# Patient Record
Sex: FEMALE | Race: WHITE
[De-identification: names, ages, dates, MRNs, and addresses within clinical notes are randomized per-mention and may not be internally consistent; named-entity substitution may affect disease eponyms.]

---

## 2019-12-05 ENCOUNTER — HOSPITAL ENCOUNTER (OUTPATIENT)
Dept: HOSPITAL 100 - SDC | Age: 49
Discharge: HOME | End: 2019-12-05
Payer: COMMERCIAL

## 2019-12-05 VITALS
RESPIRATION RATE: 16 BRPM | SYSTOLIC BLOOD PRESSURE: 145 MMHG | OXYGEN SATURATION: 94 % | HEART RATE: 92 BPM | DIASTOLIC BLOOD PRESSURE: 78 MMHG

## 2019-12-05 VITALS
RESPIRATION RATE: 16 BRPM | SYSTOLIC BLOOD PRESSURE: 153 MMHG | HEART RATE: 91 BPM | OXYGEN SATURATION: 94 % | DIASTOLIC BLOOD PRESSURE: 83 MMHG

## 2019-12-05 VITALS
OXYGEN SATURATION: 94 % | DIASTOLIC BLOOD PRESSURE: 83 MMHG | HEART RATE: 87 BPM | RESPIRATION RATE: 16 BRPM | SYSTOLIC BLOOD PRESSURE: 141 MMHG | TEMPERATURE: 97.34 F

## 2019-12-05 VITALS
HEART RATE: 90 BPM | DIASTOLIC BLOOD PRESSURE: 83 MMHG | SYSTOLIC BLOOD PRESSURE: 149 MMHG | OXYGEN SATURATION: 97 % | RESPIRATION RATE: 16 BRPM

## 2019-12-05 VITALS
OXYGEN SATURATION: 97 % | DIASTOLIC BLOOD PRESSURE: 90 MMHG | SYSTOLIC BLOOD PRESSURE: 148 MMHG | RESPIRATION RATE: 16 BRPM | HEART RATE: 91 BPM

## 2019-12-05 VITALS
HEART RATE: 78 BPM | DIASTOLIC BLOOD PRESSURE: 83 MMHG | TEMPERATURE: 98.4 F | RESPIRATION RATE: 16 BRPM | OXYGEN SATURATION: 97 % | SYSTOLIC BLOOD PRESSURE: 145 MMHG

## 2019-12-05 VITALS
HEART RATE: 93 BPM | RESPIRATION RATE: 16 BRPM | OXYGEN SATURATION: 100 % | SYSTOLIC BLOOD PRESSURE: 145 MMHG | TEMPERATURE: 97.52 F | DIASTOLIC BLOOD PRESSURE: 83 MMHG

## 2019-12-05 VITALS — SYSTOLIC BLOOD PRESSURE: 145 MMHG | DIASTOLIC BLOOD PRESSURE: 83 MMHG

## 2019-12-05 VITALS — BODY MASS INDEX: 39.3 KG/M2

## 2019-12-05 DIAGNOSIS — I10: ICD-10-CM

## 2019-12-05 DIAGNOSIS — E03.9: ICD-10-CM

## 2019-12-05 DIAGNOSIS — Z79.899: ICD-10-CM

## 2019-12-05 DIAGNOSIS — N88.2: ICD-10-CM

## 2019-12-05 DIAGNOSIS — N93.9: Primary | ICD-10-CM

## 2019-12-05 DIAGNOSIS — R93.89: ICD-10-CM

## 2019-12-05 LAB — INTERNAL QC VALIDATED?: (no result)

## 2019-12-05 PROCEDURE — 00952 ANES VAG PX HYSTSC&/HSG: CPT

## 2019-12-05 PROCEDURE — 88305 TISSUE EXAM BY PATHOLOGIST: CPT

## 2019-12-05 PROCEDURE — 58558 HYSTEROSCOPY BIOPSY: CPT

## 2019-12-05 PROCEDURE — 81025 URINE PREGNANCY TEST: CPT

## 2019-12-05 PROCEDURE — 0UB98ZZ EXCISION OF UTERUS, VIA NATURAL OR ARTIFICIAL OPENING ENDOSCOPIC: ICD-10-PCS | Performed by: OBSTETRICS & GYNECOLOGY

## 2019-12-05 RX ADMIN — SODIUM CHLORIDE 10 ML: 9 INJECTION, SOLUTION INTRAMUSCULAR; INTRAVENOUS; SUBCUTANEOUS at 16:46

## 2019-12-05 RX ADMIN — KETOROLAC TROMETHAMINE 30 MG: 30 INJECTION, SOLUTION INTRAMUSCULAR; INTRAVENOUS at 13:55

## 2023-04-10 ENCOUNTER — OFFICE VISIT (OUTPATIENT)
Dept: PRIMARY CARE | Facility: CLINIC | Age: 53
End: 2023-04-10
Payer: COMMERCIAL

## 2023-04-10 VITALS
SYSTOLIC BLOOD PRESSURE: 138 MMHG | TEMPERATURE: 97.7 F | WEIGHT: 200.4 LBS | HEIGHT: 59 IN | BODY MASS INDEX: 40.4 KG/M2 | DIASTOLIC BLOOD PRESSURE: 86 MMHG | HEART RATE: 83 BPM

## 2023-04-10 DIAGNOSIS — M79.645 THUMB PAIN, LEFT: ICD-10-CM

## 2023-04-10 DIAGNOSIS — R53.83 OTHER FATIGUE: ICD-10-CM

## 2023-04-10 DIAGNOSIS — E55.9 VITAMIN D DEFICIENCY: ICD-10-CM

## 2023-04-10 DIAGNOSIS — Z00.00 HEALTHCARE MAINTENANCE: ICD-10-CM

## 2023-04-10 DIAGNOSIS — N95.1 MENOPAUSAL SYMPTOMS: ICD-10-CM

## 2023-04-10 DIAGNOSIS — R73.03 PREDIABETES: Primary | ICD-10-CM

## 2023-04-10 DIAGNOSIS — E66.01 CLASS 3 SEVERE OBESITY DUE TO EXCESS CALORIES WITH SERIOUS COMORBIDITY AND BODY MASS INDEX (BMI) OF 40.0 TO 44.9 IN ADULT (MULTI): ICD-10-CM

## 2023-04-10 DIAGNOSIS — I10 PRIMARY HYPERTENSION: ICD-10-CM

## 2023-04-10 DIAGNOSIS — E03.9 HYPOTHYROIDISM, UNSPECIFIED TYPE: ICD-10-CM

## 2023-04-10 PROBLEM — E66.813 CLASS 3 SEVERE OBESITY DUE TO EXCESS CALORIES WITH SERIOUS COMORBIDITY AND BODY MASS INDEX (BMI) OF 40.0 TO 44.9 IN ADULT: Status: ACTIVE | Noted: 2023-04-10

## 2023-04-10 PROCEDURE — 99205 OFFICE O/P NEW HI 60 MIN: CPT | Performed by: INTERNAL MEDICINE

## 2023-04-10 PROCEDURE — 3075F SYST BP GE 130 - 139MM HG: CPT | Performed by: INTERNAL MEDICINE

## 2023-04-10 PROCEDURE — 3008F BODY MASS INDEX DOCD: CPT | Performed by: INTERNAL MEDICINE

## 2023-04-10 PROCEDURE — 3079F DIAST BP 80-89 MM HG: CPT | Performed by: INTERNAL MEDICINE

## 2023-04-10 PROCEDURE — 1036F TOBACCO NON-USER: CPT | Performed by: INTERNAL MEDICINE

## 2023-04-10 RX ORDER — METFORMIN HYDROCHLORIDE EXTENDED-RELEASE TABLETS 500 MG/1
500 TABLET, FILM COATED, EXTENDED RELEASE ORAL 2 TIMES DAILY
COMMUNITY
End: 2023-05-15 | Stop reason: ALTCHOICE

## 2023-04-10 RX ORDER — LEVOTHYROXINE SODIUM 50 UG/1
50 TABLET ORAL DAILY
COMMUNITY
End: 2023-05-15 | Stop reason: SDUPTHER

## 2023-04-10 RX ORDER — HYDROCHLOROTHIAZIDE 25 MG/1
25 TABLET ORAL DAILY
COMMUNITY
End: 2023-05-15 | Stop reason: SDUPTHER

## 2023-04-10 RX ORDER — OSPEMIFENE 60 MG/1
1 TABLET, FILM COATED ORAL DAILY
COMMUNITY

## 2023-04-10 RX ORDER — ALBUTEROL SULFATE 90 UG/1
2 AEROSOL, METERED RESPIRATORY (INHALATION) EVERY 4 HOURS PRN
COMMUNITY
Start: 2022-11-15 | End: 2023-04-10 | Stop reason: ALTCHOICE

## 2023-04-10 RX ORDER — PHENYLPROPANOLAMINE/CLEMASTINE 75-1.34MG
1 TABLET, EXTENDED RELEASE ORAL
COMMUNITY

## 2023-04-10 ASSESSMENT — ENCOUNTER SYMPTOMS
SORE THROAT: 0
DIZZINESS: 0
VOICE CHANGE: 0
NEUROLOGICAL NEGATIVE: 1
BRUISES/BLEEDS EASILY: 0
AGITATION: 0
LOSS OF SENSATION IN FEET: 0
RESPIRATORY NEGATIVE: 1
FREQUENCY: 0
CONFUSION: 0
ENDOCRINE NEGATIVE: 1
EYE DISCHARGE: 0
SINUS PRESSURE: 0
DIFFICULTY URINATING: 0
UNEXPECTED WEIGHT CHANGE: 0
EYE PAIN: 0
POLYDIPSIA: 0
DYSURIA: 0
CARDIOVASCULAR NEGATIVE: 1
SHORTNESS OF BREATH: 0
PALPITATIONS: 0
EYE REDNESS: 0
HALLUCINATIONS: 0
EYES NEGATIVE: 1
VOMITING: 0
COLOR CHANGE: 0
DEPRESSION: 0
ABDOMINAL PAIN: 0
ADENOPATHY: 0
HEADACHES: 0
FLANK PAIN: 0
SLEEP DISTURBANCE: 0
BACK PAIN: 0
ARTHRALGIAS: 1
STRIDOR: 0
ACTIVITY CHANGE: 0
FACIAL ASYMMETRY: 0
DIARRHEA: 0
NECK STIFFNESS: 0
WOUND: 0
CONSTITUTIONAL NEGATIVE: 1
NERVOUS/ANXIOUS: 0
OCCASIONAL FEELINGS OF UNSTEADINESS: 0
SPEECH DIFFICULTY: 0
WEAKNESS: 0
NECK PAIN: 0
CONSTIPATION: 0
GASTROINTESTINAL NEGATIVE: 1
SEIZURES: 0
TROUBLE SWALLOWING: 0
POLYPHAGIA: 0
MYALGIAS: 0
BLOOD IN STOOL: 0
COUGH: 0
APPETITE CHANGE: 0
NUMBNESS: 0
TREMORS: 0
LIGHT-HEADEDNESS: 0
WHEEZING: 0
ALLERGIC/IMMUNOLOGIC NEGATIVE: 1
PSYCHIATRIC NEGATIVE: 1
CHEST TIGHTNESS: 0
NAUSEA: 0
JOINT SWELLING: 0
HEMATOLOGIC/LYMPHATIC NEGATIVE: 1
SINUS PAIN: 0

## 2023-04-10 ASSESSMENT — PROMIS GLOBAL HEALTH SCALE
RATE_AVERAGE_FATIGUE: MODERATE
RATE_QUALITY_OF_LIFE: VERY GOOD
RATE_GENERAL_HEALTH: GOOD
RATE_SOCIAL_SATISFACTION: VERY GOOD
RATE_AVERAGE_PAIN: 3
CARRYOUT_PHYSICAL_ACTIVITIES: COMPLETELY
RATE_PHYSICAL_HEALTH: GOOD
EMOTIONAL_PROBLEMS: SOMETIMES
RATE_MENTAL_HEALTH: GOOD
CARRYOUT_SOCIAL_ACTIVITIES: VERY GOOD

## 2023-04-10 ASSESSMENT — PATIENT HEALTH QUESTIONNAIRE - PHQ9
SUM OF ALL RESPONSES TO PHQ9 QUESTIONS 1 AND 2: 0
2. FEELING DOWN, DEPRESSED OR HOPELESS: NOT AT ALL
1. LITTLE INTEREST OR PLEASURE IN DOING THINGS: NOT AT ALL

## 2023-04-10 NOTE — PROGRESS NOTES
Subjective   Patient ID: Tammy Rena Lepley is a 52 y.o. female who presents for Annual Exam  HPI    Patient comes to establish new PCP.  This is her initial visit in my office, we will postpone her Physical exam until next visit.    Her previous PCP was NP at [facility].    Elevated blood pressure noted on arrival. Decreased after resting in the office. I personally rechecked patient's blood pressure.     She has h/o borderline DM, HTN, HLD, Hypothyroidism, menopause at age 50, weight problems, varicose veins, OA.    C/o left thumb pain and stiffness.     Concerned about her heart.  Will discuss her risk in more details after lipid panel results available.   Father: CAD, s/stent, CVA, blood clots  Sister: MI at age 30, blood clots  Heart problems on father's side of family  Mother: CHF    Patient has 2 sons: she was pregnant at age : 36, 36  Had gestational DM with second child    Was on BCP when she was young, quit several ys before pregnancy.    She has been following with weight management physician at [facility], advised Metformin. Did not lose much weight.    We discussed importance of low carbohydrate diet and daily exercise.   I explained to patient main principles of low carb diet, gave her brochure, discussed details.  Advised 1500 nicki/day, and  carbs. g/day.     Patient has been following with GYN regarding menopausal sx and was advised Osphena.    She works at [employer] has been pretty active, walking every day.     Last mammogram: Oct 2022  Colonoscopy: Cologuard: 2022? Negative      Review of Systems   Constitutional: Negative.  Negative for activity change, appetite change and unexpected weight change.   HENT: Negative.  Negative for congestion, ear discharge, ear pain, hearing loss, mouth sores, nosebleeds, sinus pressure, sinus pain, sore throat, trouble swallowing and voice change.    Eyes: Negative.  Negative for pain, discharge, redness and visual disturbance.   Respiratory: Negative.   "Negative for cough, chest tightness, shortness of breath, wheezing and stridor.    Cardiovascular: Negative.  Negative for chest pain, palpitations and leg swelling.   Gastrointestinal: Negative.  Negative for abdominal pain, blood in stool, constipation, diarrhea, nausea and vomiting.   Endocrine: Negative.  Negative for polydipsia, polyphagia and polyuria.   Genitourinary: Negative.  Negative for difficulty urinating, dysuria, flank pain, frequency and urgency.   Musculoskeletal:  Positive for arthralgias. Negative for back pain, gait problem, joint swelling, myalgias, neck pain and neck stiffness.   Skin: Negative.  Negative for color change, rash and wound.   Allergic/Immunologic: Negative.  Negative for environmental allergies, food allergies and immunocompromised state.   Neurological: Negative.  Negative for dizziness, tremors, seizures, syncope, facial asymmetry, speech difficulty, weakness, light-headedness, numbness and headaches.   Hematological: Negative.  Negative for adenopathy. Does not bruise/bleed easily.   Psychiatric/Behavioral: Negative.  Negative for agitation, behavioral problems, confusion, hallucinations, sleep disturbance and suicidal ideas. The patient is not nervous/anxious.    All other systems reviewed and are negative.      Objective     Review of systems was performed and all systems were negative except what in HPI    /86   Pulse 83   Temp 36.5 °C (97.7 °F)   Ht 1.499 m (4' 11\")   Wt 90.9 kg (200 lb 6.4 oz)   PF 95 L/min   BMI 40.48 kg/m²    Physical Exam  Vitals and nursing note reviewed.   Constitutional:       General: She is not in acute distress.     Appearance: Normal appearance.   HENT:      Head: Normocephalic and atraumatic.      Right Ear: External ear normal.      Left Ear: External ear normal.      Nose: Nose normal. No congestion or rhinorrhea.   Eyes:      General:         Right eye: No discharge.         Left eye: No discharge.      Extraocular Movements: " Extraocular movements intact.      Conjunctiva/sclera: Conjunctivae normal.      Pupils: Pupils are equal, round, and reactive to light.   Cardiovascular:      Rate and Rhythm: Normal rate and regular rhythm.      Pulses: Normal pulses.      Heart sounds: Normal heart sounds. No murmur heard.     No friction rub. No gallop.   Pulmonary:      Effort: Pulmonary effort is normal. No respiratory distress.      Breath sounds: Normal breath sounds. No stridor. No wheezing, rhonchi or rales.   Chest:      Chest wall: No tenderness.   Abdominal:      General: Bowel sounds are normal.      Palpations: Abdomen is soft. There is no mass.      Tenderness: There is no abdominal tenderness. There is no guarding or rebound.   Musculoskeletal:         General: No swelling or deformity. Normal range of motion.      Cervical back: Normal range of motion and neck supple. No rigidity or tenderness.      Right lower leg: No edema.      Left lower leg: No edema.   Lymphadenopathy:      Cervical: No cervical adenopathy.   Skin:     General: Skin is warm and dry.      Coloration: Skin is not jaundiced.      Findings: No bruising or erythema.   Neurological:      General: No focal deficit present.      Mental Status: She is alert and oriented to person, place, and time. Mental status is at baseline.      Cranial Nerves: No cranial nerve deficit.      Motor: No weakness.      Coordination: Coordination normal.      Gait: Gait normal.   Psychiatric:         Mood and Affect: Mood normal.         Behavior: Behavior normal.         Thought Content: Thought content normal.         Judgment: Judgment normal.         Assessment/Plan   Problem List Items Addressed This Visit          Circulatory    Primary hypertension     Controlled. Continue current medications and DASH diet.            Musculoskeletal    Thumb pain, left    Relevant Orders    Referral to Orthopaedic Surgery       Endocrine/Metabolic    Prediabetes - Primary    Relevant Orders     Hemoglobin A1C    Vitamin D deficiency     Continue Vit d supplement.         Relevant Orders    Vitamin D, Total    Hypothyroidism     Clinically stable. Continue Levothyroxine.         Class 3 severe obesity due to excess calories with serious comorbidity and body mass index (BMI) of 40.0 to 44.9 in adult (CMS/[facility])     Weight loss is advised. Target BMI: 25. Please follow low carbohydrate diet and exercise at least 150 minutes weekly.  Nutritional consultation is available, please let me know if interested.          BMI 40.0-44.9, adult (CMS/[facility])       Other    Other fatigue    Relevant Orders    TSH with reflex to Free T4 if abnormal    Healthcare maintenance    Relevant Orders    CBC    Comprehensive Metabolic Panel    Lipid Panel    Menopausal symptoms     F/up with GYN.          It was a pleasure to see you today.  I would like to remind you about importance of a healthy lifestyle in order to improve your well-being and live longer.  Try to engage in physical activities for at least 150 minutes per week.  Eat about 10 servings of fruits and vegetables daily. My advice is 2 servings of fruits and 8 servings of vegetables.  For vegetables choose at least half of them green and at least half of them fresh.  Please avoid sugar, salt, fried food and saturated fat.    I spent a total of 60 minutes on the date of service which included preparing to see the patient, face-to-face patient care, completing clinical documentation, obtaining and/or reviewing separately obtained history, performing a medically appropriate examination, counseling and educating the patient/family/caregiver, ordering medications, tests, or procedures, communicating with other health care providers (not separately reported), independently interpreting results (not separately reported), communicating results to the patient/family/caregiver, and care coordination (not separately reported).    F/up in 2 months for PE or sooner if needed.

## 2023-04-22 ENCOUNTER — LAB (OUTPATIENT)
Dept: LAB | Facility: LAB | Age: 53
End: 2023-04-22
Payer: COMMERCIAL

## 2023-04-22 DIAGNOSIS — Z00.00 HEALTHCARE MAINTENANCE: ICD-10-CM

## 2023-04-22 DIAGNOSIS — R73.03 PREDIABETES: ICD-10-CM

## 2023-04-22 DIAGNOSIS — E55.9 VITAMIN D DEFICIENCY: ICD-10-CM

## 2023-04-22 DIAGNOSIS — R53.83 OTHER FATIGUE: ICD-10-CM

## 2023-04-22 LAB
ALANINE AMINOTRANSFERASE (SGPT) (U/L) IN SER/PLAS: 9 U/L (ref 7–45)
ALBUMIN (G/DL) IN SER/PLAS: 4.1 G/DL (ref 3.4–5)
ALKALINE PHOSPHATASE (U/L) IN SER/PLAS: 80 U/L (ref 33–110)
ANION GAP IN SER/PLAS: 13 MMOL/L (ref 10–20)
ASPARTATE AMINOTRANSFERASE (SGOT) (U/L) IN SER/PLAS: 9 U/L (ref 9–39)
BILIRUBIN TOTAL (MG/DL) IN SER/PLAS: 0.4 MG/DL (ref 0–1.2)
CALCIDIOL (25 OH VITAMIN D3) (NG/ML) IN SER/PLAS: 28 NG/ML
CALCIUM (MG/DL) IN SER/PLAS: 9.8 MG/DL (ref 8.6–10.6)
CARBON DIOXIDE, TOTAL (MMOL/L) IN SER/PLAS: 32 MMOL/L (ref 21–32)
CHLORIDE (MMOL/L) IN SER/PLAS: 101 MMOL/L (ref 98–107)
CHOLESTEROL (MG/DL) IN SER/PLAS: 263 MG/DL (ref 0–199)
CHOLESTEROL IN HDL (MG/DL) IN SER/PLAS: 55.7 MG/DL
CHOLESTEROL/HDL RATIO: 4.7
CREATININE (MG/DL) IN SER/PLAS: 0.9 MG/DL (ref 0.5–1.05)
ERYTHROCYTE DISTRIBUTION WIDTH (RATIO) BY AUTOMATED COUNT: 12.4 % (ref 11.5–14.5)
ERYTHROCYTE MEAN CORPUSCULAR HEMOGLOBIN CONCENTRATION (G/DL) BY AUTOMATED: 31.3 G/DL (ref 32–36)
ERYTHROCYTE MEAN CORPUSCULAR VOLUME (FL) BY AUTOMATED COUNT: 97 FL (ref 80–100)
ERYTHROCYTES (10*6/UL) IN BLOOD BY AUTOMATED COUNT: 4.21 X10E12/L (ref 4–5.2)
ESTIMATED AVERAGE GLUCOSE FOR HBA1C: 120 MG/DL
GFR FEMALE: 77 ML/MIN/1.73M2
GLUCOSE (MG/DL) IN SER/PLAS: 91 MG/DL (ref 74–99)
HEMATOCRIT (%) IN BLOOD BY AUTOMATED COUNT: 40.9 % (ref 36–46)
HEMOGLOBIN (G/DL) IN BLOOD: 12.8 G/DL (ref 12–16)
HEMOGLOBIN A1C/HEMOGLOBIN TOTAL IN BLOOD: 5.8 %
LDL: 156 MG/DL (ref 0–99)
LEUKOCYTES (10*3/UL) IN BLOOD BY AUTOMATED COUNT: 5.9 X10E9/L (ref 4.4–11.3)
NON HDL CHOLESTEROL: 207 MG/DL
NRBC (PER 100 WBCS) BY AUTOMATED COUNT: 0 /100 WBC (ref 0–0)
PLATELETS (10*3/UL) IN BLOOD AUTOMATED COUNT: 311 X10E9/L (ref 150–450)
POTASSIUM (MMOL/L) IN SER/PLAS: 4.3 MMOL/L (ref 3.5–5.3)
PROTEIN TOTAL: 6.9 G/DL (ref 6.4–8.2)
SODIUM (MMOL/L) IN SER/PLAS: 142 MMOL/L (ref 136–145)
THYROTROPIN (MIU/L) IN SER/PLAS BY DETECTION LIMIT <= 0.05 MIU/L: 1.94 MIU/L (ref 0.44–3.98)
TRIGLYCERIDE (MG/DL) IN SER/PLAS: 259 MG/DL (ref 0–149)
UREA NITROGEN (MG/DL) IN SER/PLAS: 14 MG/DL (ref 6–23)
VLDL: 52 MG/DL (ref 0–40)

## 2023-04-22 PROCEDURE — 83036 HEMOGLOBIN GLYCOSYLATED A1C: CPT

## 2023-04-22 PROCEDURE — 82306 VITAMIN D 25 HYDROXY: CPT

## 2023-04-22 PROCEDURE — 84443 ASSAY THYROID STIM HORMONE: CPT

## 2023-04-22 PROCEDURE — 80061 LIPID PANEL: CPT

## 2023-04-22 PROCEDURE — 80053 COMPREHEN METABOLIC PANEL: CPT

## 2023-04-22 PROCEDURE — 85027 COMPLETE CBC AUTOMATED: CPT

## 2023-04-22 PROCEDURE — 36415 COLL VENOUS BLD VENIPUNCTURE: CPT

## 2023-05-15 ENCOUNTER — OFFICE VISIT (OUTPATIENT)
Dept: PRIMARY CARE | Facility: CLINIC | Age: 53
End: 2023-05-15
Payer: COMMERCIAL

## 2023-05-15 ENCOUNTER — APPOINTMENT (OUTPATIENT)
Dept: PRIMARY CARE | Facility: CLINIC | Age: 53
End: 2023-05-15
Payer: COMMERCIAL

## 2023-05-15 VITALS
WEIGHT: 197 LBS | HEART RATE: 89 BPM | HEIGHT: 59 IN | DIASTOLIC BLOOD PRESSURE: 87 MMHG | RESPIRATION RATE: 14 BRPM | OXYGEN SATURATION: 98 % | SYSTOLIC BLOOD PRESSURE: 133 MMHG | BODY MASS INDEX: 39.72 KG/M2 | TEMPERATURE: 97.5 F

## 2023-05-15 DIAGNOSIS — E03.8 OTHER SPECIFIED HYPOTHYROIDISM: ICD-10-CM

## 2023-05-15 DIAGNOSIS — E66.01 CLASS 2 SEVERE OBESITY DUE TO EXCESS CALORIES WITH SERIOUS COMORBIDITY AND BODY MASS INDEX (BMI) OF 35.0 TO 35.9 IN ADULT (MULTI): ICD-10-CM

## 2023-05-15 DIAGNOSIS — N95.1 MENOPAUSAL SYMPTOMS: ICD-10-CM

## 2023-05-15 DIAGNOSIS — E78.2 MIXED HYPERLIPIDEMIA: Primary | ICD-10-CM

## 2023-05-15 DIAGNOSIS — I10 PRIMARY HYPERTENSION: ICD-10-CM

## 2023-05-15 DIAGNOSIS — Z00.00 HEALTHCARE MAINTENANCE: ICD-10-CM

## 2023-05-15 DIAGNOSIS — R73.03 PREDIABETES: ICD-10-CM

## 2023-05-15 PROBLEM — R40.0 DAYTIME SLEEPINESS: Status: ACTIVE | Noted: 2019-03-12

## 2023-05-15 PROBLEM — M25.569 JOINT PAIN, KNEE: Status: ACTIVE | Noted: 2023-05-15

## 2023-05-15 PROBLEM — M54.9 BACK PAIN: Status: ACTIVE | Noted: 2019-03-12

## 2023-05-15 PROBLEM — K21.9 ACID REFLUX: Status: ACTIVE | Noted: 2019-03-12

## 2023-05-15 PROBLEM — E78.5 HYPERLIPIDEMIA: Status: ACTIVE | Noted: 2019-03-12

## 2023-05-15 PROBLEM — R53.83 FATIGUE: Status: ACTIVE | Noted: 2019-03-12

## 2023-05-15 PROCEDURE — 1036F TOBACCO NON-USER: CPT | Performed by: INTERNAL MEDICINE

## 2023-05-15 PROCEDURE — 3075F SYST BP GE 130 - 139MM HG: CPT | Performed by: INTERNAL MEDICINE

## 2023-05-15 PROCEDURE — 3008F BODY MASS INDEX DOCD: CPT | Performed by: INTERNAL MEDICINE

## 2023-05-15 PROCEDURE — 3079F DIAST BP 80-89 MM HG: CPT | Performed by: INTERNAL MEDICINE

## 2023-05-15 PROCEDURE — 93000 ELECTROCARDIOGRAM COMPLETE: CPT | Performed by: INTERNAL MEDICINE

## 2023-05-15 PROCEDURE — 99396 PREV VISIT EST AGE 40-64: CPT | Performed by: INTERNAL MEDICINE

## 2023-05-15 RX ORDER — METFORMIN HYDROCHLORIDE 500 MG/1
1000 TABLET, EXTENDED RELEASE ORAL
Qty: 180 TABLET | Refills: 3 | Status: SHIPPED | OUTPATIENT
Start: 2023-05-15 | End: 2023-10-04

## 2023-05-15 RX ORDER — LEVOTHYROXINE SODIUM 50 UG/1
50 TABLET ORAL DAILY
Qty: 90 TABLET | Refills: 0 | Status: SHIPPED | OUTPATIENT
Start: 2023-05-15 | End: 2023-12-18 | Stop reason: SDUPTHER

## 2023-05-15 RX ORDER — HYDROCHLOROTHIAZIDE 25 MG/1
25 TABLET ORAL DAILY
Qty: 90 TABLET | Refills: 3 | Status: SHIPPED | OUTPATIENT
Start: 2023-05-15

## 2023-05-15 RX ORDER — OSPEMIFENE 60 MG/1
1 TABLET, FILM COATED ORAL DAILY
Qty: 90 TABLET | Refills: 3 | Status: CANCELLED | OUTPATIENT
Start: 2023-05-15

## 2023-05-15 RX ORDER — METFORMIN HYDROCHLORIDE 500 MG/1
1000 TABLET, EXTENDED RELEASE ORAL
COMMUNITY
End: 2023-05-15 | Stop reason: SDUPTHER

## 2023-05-15 RX ORDER — METFORMIN HYDROCHLORIDE EXTENDED-RELEASE TABLETS 500 MG/1
500 TABLET, FILM COATED, EXTENDED RELEASE ORAL 2 TIMES DAILY
Qty: 90 TABLET | Refills: 3 | Status: CANCELLED | OUTPATIENT
Start: 2023-05-15

## 2023-05-15 ASSESSMENT — ENCOUNTER SYMPTOMS
DIZZINESS: 0
LOSS OF SENSATION IN FEET: 0
APPETITE CHANGE: 0
MYALGIAS: 0
HEMATOLOGIC/LYMPHATIC NEGATIVE: 1
FACIAL ASYMMETRY: 0
GASTROINTESTINAL NEGATIVE: 1
TROUBLE SWALLOWING: 0
NERVOUS/ANXIOUS: 0
COUGH: 0
ACTIVITY CHANGE: 0
WHEEZING: 0
ALLERGIC/IMMUNOLOGIC NEGATIVE: 1
CARDIOVASCULAR NEGATIVE: 1
COLOR CHANGE: 0
NECK STIFFNESS: 0
OCCASIONAL FEELINGS OF UNSTEADINESS: 0
EYE PAIN: 0
LIGHT-HEADEDNESS: 0
CONSTIPATION: 0
BACK PAIN: 0
WOUND: 0
AGITATION: 0
EYES NEGATIVE: 1
MUSCULOSKELETAL NEGATIVE: 1
HALLUCINATIONS: 0
NAUSEA: 0
NECK PAIN: 0
RESPIRATORY NEGATIVE: 1
SINUS PRESSURE: 0
DIFFICULTY URINATING: 0
CHEST TIGHTNESS: 0
SINUS PAIN: 0
UNEXPECTED WEIGHT CHANGE: 0
WEAKNESS: 0
HEADACHES: 0
ABDOMINAL PAIN: 0
BLOOD IN STOOL: 0
SORE THROAT: 0
CONFUSION: 0
BRUISES/BLEEDS EASILY: 0
DIARRHEA: 0
SEIZURES: 0
STRIDOR: 0
SPEECH DIFFICULTY: 0
NEUROLOGICAL NEGATIVE: 1
PSYCHIATRIC NEGATIVE: 1
TREMORS: 0
VOICE CHANGE: 0
SHORTNESS OF BREATH: 0
EYE REDNESS: 0
VOMITING: 0
FREQUENCY: 0
ADENOPATHY: 0
PALPITATIONS: 0
ARTHRALGIAS: 0
DEPRESSION: 0
DYSURIA: 0
FLANK PAIN: 0
EYE DISCHARGE: 0
ENDOCRINE NEGATIVE: 1
JOINT SWELLING: 0
SLEEP DISTURBANCE: 0
POLYDIPSIA: 0
POLYPHAGIA: 0
NUMBNESS: 0
CONSTITUTIONAL NEGATIVE: 1

## 2023-05-15 ASSESSMENT — PROMIS GLOBAL HEALTH SCALE
RATE_AVERAGE_PAIN: 3
RATE_GENERAL_HEALTH: EXCELLENT
EMOTIONAL_PROBLEMS: RARELY
RATE_AVERAGE_FATIGUE: MODERATE
CARRYOUT_SOCIAL_ACTIVITIES: VERY GOOD
RATE_MENTAL_HEALTH: GOOD
RATE_QUALITY_OF_LIFE: VERY GOOD
CARRYOUT_PHYSICAL_ACTIVITIES: COMPLETELY
RATE_SOCIAL_SATISFACTION: VERY GOOD
RATE_PHYSICAL_HEALTH: GOOD

## 2023-05-15 ASSESSMENT — PATIENT HEALTH QUESTIONNAIRE - PHQ9
1. LITTLE INTEREST OR PLEASURE IN DOING THINGS: NOT AT ALL
2. FEELING DOWN, DEPRESSED OR HOPELESS: NOT AT ALL
SUM OF ALL RESPONSES TO PHQ9 QUESTIONS 1 AND 2: 0

## 2023-05-15 NOTE — PROGRESS NOTES
Subjective   Patient ID: Tammy Rena Lepley is a 52 y.o. female who presents for Annual Exam (Patient is here for a physical. ).  HPI    Patient has been feeling pretty good and has been complaint with prescribed medications.    We reviewed and discussed details of recent blood work: CBC, CMP, TSH, Lipid panel, Hb A1c, Vit D. Results within acceptable range except elevated cholesterol, TG, hbA1c: 5.8.      Last mamogram: Oct 2022  Cologuard: Nov 2021: neg  PAP: 2022 (GYN at [facility] Lance Mendoza)   GYN prescribes Osphena      Review of Systems   Constitutional: Negative.  Negative for activity change, appetite change and unexpected weight change.   HENT: Negative.  Negative for congestion, ear discharge, ear pain, hearing loss, mouth sores, nosebleeds, sinus pressure, sinus pain, sore throat, trouble swallowing and voice change.    Eyes: Negative.  Negative for pain, discharge, redness and visual disturbance.   Respiratory: Negative.  Negative for cough, chest tightness, shortness of breath, wheezing and stridor.    Cardiovascular: Negative.  Negative for chest pain, palpitations and leg swelling.   Gastrointestinal: Negative.  Negative for abdominal pain, blood in stool, constipation, diarrhea, nausea and vomiting.   Endocrine: Negative.  Negative for polydipsia, polyphagia and polyuria.   Genitourinary: Negative.  Negative for difficulty urinating, dysuria, flank pain, frequency and urgency.   Musculoskeletal: Negative.  Negative for arthralgias, back pain, gait problem, joint swelling, myalgias, neck pain and neck stiffness.   Skin: Negative.  Negative for color change, rash and wound.   Allergic/Immunologic: Negative.  Negative for environmental allergies, food allergies and immunocompromised state.   Neurological: Negative.  Negative for dizziness, tremors, seizures, syncope, facial asymmetry, speech difficulty, weakness, light-headedness, numbness and headaches.   Hematological: Negative.  Negative for  "adenopathy. Does not bruise/bleed easily.   Psychiatric/Behavioral: Negative.  Negative for agitation, behavioral problems, confusion, hallucinations, sleep disturbance and suicidal ideas. The patient is not nervous/anxious.    All other systems reviewed and are negative.      Objective     Review of systems was performed and all systems were negative except what in HPI    /87   Pulse 89   Temp 36.4 °C (97.5 °F)   Resp 14   Ht 1.499 m (4' 11\")   Wt 89.4 kg (197 lb)   SpO2 98%   BMI 39.79 kg/m²    Physical Exam  Vitals and nursing note reviewed.   Constitutional:       General: She is not in acute distress.     Appearance: Normal appearance.   HENT:      Head: Normocephalic and atraumatic.      Right Ear: External ear normal.      Left Ear: External ear normal.      Nose: Nose normal. No congestion or rhinorrhea.   Eyes:      General:         Right eye: No discharge.         Left eye: No discharge.      Extraocular Movements: Extraocular movements intact.      Conjunctiva/sclera: Conjunctivae normal.      Pupils: Pupils are equal, round, and reactive to light.   Cardiovascular:      Rate and Rhythm: Normal rate and regular rhythm.      Pulses: Normal pulses.      Heart sounds: Normal heart sounds. No murmur heard.     No friction rub. No gallop.   Pulmonary:      Effort: Pulmonary effort is normal. No respiratory distress.      Breath sounds: Normal breath sounds. No stridor. No wheezing, rhonchi or rales.   Chest:      Chest wall: No tenderness.   Abdominal:      General: Bowel sounds are normal.      Palpations: Abdomen is soft. There is no mass.      Tenderness: There is no abdominal tenderness. There is no guarding or rebound.   Musculoskeletal:         General: No swelling or deformity. Normal range of motion.      Cervical back: Normal range of motion and neck supple. No rigidity or tenderness.      Right lower leg: No edema.      Left lower leg: No edema.   Lymphadenopathy:      Cervical: No " cervical adenopathy.   Skin:     General: Skin is warm and dry.      Coloration: Skin is not jaundiced.      Findings: No bruising or erythema.   Neurological:      General: No focal deficit present.      Mental Status: She is alert and oriented to person, place, and time. Mental status is at baseline.      Cranial Nerves: No cranial nerve deficit.      Motor: No weakness.      Coordination: Coordination normal.      Gait: Gait normal.   Psychiatric:         Mood and Affect: Mood normal.         Behavior: Behavior normal.         Thought Content: Thought content normal.         Judgment: Judgment normal.         Assessment/Plan   Problem List Items Addressed This Visit          Circulatory    Primary hypertension     Controlled. Continue current medications and DASH diet.         Relevant Medications    hydroCHLOROthiazide (HYDRODiuril) 25 mg tablet       Endocrine/Metabolic    Prediabetes    Relevant Medications    metFORMIN XR (Glucophage-XR) 500 mg 24 hr tablet    Other Relevant Orders    CBC    Hemoglobin A1C    Hypothyroidism     Clinically stable. Continue Levothyroxine.         Relevant Medications    levothyroxine (Synthroid, Levoxyl) 50 mcg tablet    Class 2 severe obesity due to excess calories with serious comorbidity and body mass index (BMI) of 35.0 to 35.9 in adult (CMS/HCC)     Weight loss is advised. Target BMI: 25. Please follow low carbohydrate diet and exercise at least 150 minutes weekly.  Nutritional consultation is available, please let me know if interested.             Other    Healthcare maintenance    Relevant Orders    ECG 12 lead (Clinic Performed) (Completed)    Menopausal symptoms     F/up with GYN.         Hyperlipidemia - Primary     Low cholesterol diet is advised.          Relevant Orders    CT cardiac scoring wo IV contrast    Comprehensive Metabolic Panel    Lipid Panel     It was a pleasure to see you today.  I would like to remind you about importance of a healthy lifestyle in  order to improve your well-being and live longer.  Try to engage in physical activities for at least 150 minutes per week.  Eat about 10 servings of fruits and vegetables daily. My advice is 2 servings of fruits and 8 servings of vegetables.  For vegetables choose at least half of them green and at least half of them fresh.  Please avoid sugar, salt, fried food and saturated fat.    F/up in 3 months or sooner if needed.

## 2023-05-16 PROBLEM — E66.812 CLASS 2 SEVERE OBESITY DUE TO EXCESS CALORIES WITH SERIOUS COMORBIDITY AND BODY MASS INDEX (BMI) OF 35.0 TO 35.9 IN ADULT: Status: ACTIVE | Noted: 2023-04-10

## 2023-06-08 ENCOUNTER — APPOINTMENT (OUTPATIENT)
Dept: PRIMARY CARE | Facility: CLINIC | Age: 53
End: 2023-06-08
Payer: COMMERCIAL

## 2023-06-11 NOTE — RESULT ENCOUNTER NOTE
Your recent CT chest coronary calcium score was 0 which corresponds to low  risk of atherosclerotic cardiovascular event in next 10 years.   We will discuss details during your next office visit.  Dr. Mueller

## 2023-08-21 ENCOUNTER — TELEPHONE (OUTPATIENT)
Dept: PRIMARY CARE | Facility: CLINIC | Age: 53
End: 2023-08-21
Payer: COMMERCIAL

## 2023-08-21 NOTE — TELEPHONE ENCOUNTER
Juan Carlos from [facility] in [place] called on back line had question about filing rx for this pt. Didn't know Synthroid should be generic or brand name?    Please call Juan Carlos back at [phone]

## 2023-09-15 ENCOUNTER — APPOINTMENT (OUTPATIENT)
Dept: PRIMARY CARE | Facility: CLINIC | Age: 53
End: 2023-09-15
Payer: COMMERCIAL

## 2023-10-04 ENCOUNTER — OFFICE VISIT (OUTPATIENT)
Dept: PRIMARY CARE | Facility: CLINIC | Age: 53
End: 2023-10-04
Payer: COMMERCIAL

## 2023-10-04 VITALS
BODY MASS INDEX: 38.75 KG/M2 | DIASTOLIC BLOOD PRESSURE: 81 MMHG | WEIGHT: 192.2 LBS | OXYGEN SATURATION: 99 % | TEMPERATURE: 97.7 F | HEART RATE: 83 BPM | SYSTOLIC BLOOD PRESSURE: 115 MMHG | HEIGHT: 59 IN | RESPIRATION RATE: 16 BRPM

## 2023-10-04 DIAGNOSIS — E03.8 OTHER SPECIFIED HYPOTHYROIDISM: Primary | ICD-10-CM

## 2023-10-04 DIAGNOSIS — E78.2 MIXED HYPERLIPIDEMIA: ICD-10-CM

## 2023-10-04 DIAGNOSIS — E66.01 CLASS 2 SEVERE OBESITY DUE TO EXCESS CALORIES WITH SERIOUS COMORBIDITY AND BODY MASS INDEX (BMI) OF 35.0 TO 35.9 IN ADULT (MULTI): ICD-10-CM

## 2023-10-04 DIAGNOSIS — E55.9 VITAMIN D DEFICIENCY: ICD-10-CM

## 2023-10-04 DIAGNOSIS — I10 PRIMARY HYPERTENSION: ICD-10-CM

## 2023-10-04 DIAGNOSIS — R73.03 PREDIABETES: ICD-10-CM

## 2023-10-04 PROCEDURE — 3074F SYST BP LT 130 MM HG: CPT | Performed by: INTERNAL MEDICINE

## 2023-10-04 PROCEDURE — 99214 OFFICE O/P EST MOD 30 MIN: CPT | Performed by: INTERNAL MEDICINE

## 2023-10-04 PROCEDURE — 1036F TOBACCO NON-USER: CPT | Performed by: INTERNAL MEDICINE

## 2023-10-04 PROCEDURE — 3079F DIAST BP 80-89 MM HG: CPT | Performed by: INTERNAL MEDICINE

## 2023-10-04 PROCEDURE — 3008F BODY MASS INDEX DOCD: CPT | Performed by: INTERNAL MEDICINE

## 2023-10-04 ASSESSMENT — ENCOUNTER SYMPTOMS
ACTIVITY CHANGE: 0
EYE PAIN: 0
DIARRHEA: 0
DYSURIA: 0
ALLERGIC/IMMUNOLOGIC NEGATIVE: 1
SINUS PRESSURE: 0
CONSTIPATION: 0
NECK PAIN: 0
SEIZURES: 0
POLYDIPSIA: 0
NUMBNESS: 0
PSYCHIATRIC NEGATIVE: 1
CARDIOVASCULAR NEGATIVE: 1
ABDOMINAL PAIN: 0
NEUROLOGICAL NEGATIVE: 1
MYALGIAS: 0
DEPRESSION: 0
NECK STIFFNESS: 0
COUGH: 0
MUSCULOSKELETAL NEGATIVE: 1
NERVOUS/ANXIOUS: 0
TROUBLE SWALLOWING: 0
COLOR CHANGE: 0
PALPITATIONS: 0
ADENOPATHY: 0
WOUND: 0
FREQUENCY: 0
POLYPHAGIA: 0
CONFUSION: 0
SPEECH DIFFICULTY: 0
APPETITE CHANGE: 0
EYES NEGATIVE: 1
DIZZINESS: 0
DIFFICULTY URINATING: 0
AGITATION: 0
HEADACHES: 0
SHORTNESS OF BREATH: 0
ENDOCRINE NEGATIVE: 1
SLEEP DISTURBANCE: 0
BRUISES/BLEEDS EASILY: 0
VOICE CHANGE: 0
HALLUCINATIONS: 0
OCCASIONAL FEELINGS OF UNSTEADINESS: 0
FACIAL ASYMMETRY: 0
LIGHT-HEADEDNESS: 0
JOINT SWELLING: 0
LOSS OF SENSATION IN FEET: 0
GASTROINTESTINAL NEGATIVE: 1
EYE DISCHARGE: 0
SINUS PAIN: 0
BACK PAIN: 0
EYE REDNESS: 0
NAUSEA: 0
CHEST TIGHTNESS: 0
WEAKNESS: 0
HEMATOLOGIC/LYMPHATIC NEGATIVE: 1
TREMORS: 0
STRIDOR: 0
VOMITING: 0
CONSTITUTIONAL NEGATIVE: 1
UNEXPECTED WEIGHT CHANGE: 0
WHEEZING: 0
ARTHRALGIAS: 0
RESPIRATORY NEGATIVE: 1
SORE THROAT: 0
FLANK PAIN: 0
BLOOD IN STOOL: 0

## 2023-10-04 NOTE — PROGRESS NOTES
Subjective   Patient ID: Tammy Rena Lepley is a 53 y.o. female who presents for Follow-up (Patient is here for a follow up./No new concerns.).  HPI    Patient has been feeling pretty good and has been complaint with prescribed medications.    We reviewed and discussed details of recent blood work: CBC, CMP, TSH, Lipid panel, Hb A1c, Vit D done in April 2023 Results within acceptable range except elevated cholesterol, glucose, HbA1c: 5.8.  Low Vit D level noted.     Sh e stopped talking Metformin prescribed by another provider to help losing weight.   Patient did not noticed any effects when taking it.  Patient has been following more healthy diet, lost 5 lbs since last visit.     Had CT CAC in May 2023 and her score was zero.    We discussed starting medication to lower cholesterol but she would prefer to incorporate more lifestyle changes first.          Review of Systems   Constitutional: Negative.  Negative for activity change, appetite change and unexpected weight change.   HENT: Negative.  Negative for congestion, ear discharge, ear pain, hearing loss, mouth sores, nosebleeds, sinus pressure, sinus pain, sore throat, trouble swallowing and voice change.    Eyes: Negative.  Negative for pain, discharge, redness and visual disturbance.   Respiratory: Negative.  Negative for cough, chest tightness, shortness of breath, wheezing and stridor.    Cardiovascular: Negative.  Negative for chest pain, palpitations and leg swelling.   Gastrointestinal: Negative.  Negative for abdominal pain, blood in stool, constipation, diarrhea, nausea and vomiting.   Endocrine: Negative.  Negative for polydipsia, polyphagia and polyuria.   Genitourinary: Negative.  Negative for difficulty urinating, dysuria, flank pain, frequency and urgency.   Musculoskeletal: Negative.  Negative for arthralgias, back pain, gait problem, joint swelling, myalgias, neck pain and neck stiffness.   Skin: Negative.  Negative for color change, rash and  "wound.   Allergic/Immunologic: Negative.  Negative for environmental allergies, food allergies and immunocompromised state.   Neurological: Negative.  Negative for dizziness, tremors, seizures, syncope, facial asymmetry, speech difficulty, weakness, light-headedness, numbness and headaches.   Hematological: Negative.  Negative for adenopathy. Does not bruise/bleed easily.   Psychiatric/Behavioral: Negative.  Negative for agitation, behavioral problems, confusion, hallucinations, sleep disturbance and suicidal ideas. The patient is not nervous/anxious.    All other systems reviewed and are negative.      Objective     Review of systems was performed and all systems were negative except what in HPI    /81   Pulse 83   Temp 36.5 °C (97.7 °F)   Resp 16   Ht 1.499 m (4' 11\")   Wt 87.2 kg (192 lb 3.2 oz)   SpO2 99%   BMI 38.82 kg/m²    Physical Exam  Vitals and nursing note reviewed.   Constitutional:       General: She is not in acute distress.     Appearance: Normal appearance.   HENT:      Head: Normocephalic and atraumatic.      Right Ear: External ear normal.      Left Ear: External ear normal.      Nose: Nose normal. No congestion or rhinorrhea.   Eyes:      General:         Right eye: No discharge.         Left eye: No discharge.      Extraocular Movements: Extraocular movements intact.      Conjunctiva/sclera: Conjunctivae normal.      Pupils: Pupils are equal, round, and reactive to light.   Cardiovascular:      Rate and Rhythm: Normal rate and regular rhythm.      Pulses: Normal pulses.      Heart sounds: Normal heart sounds. No murmur heard.     No friction rub. No gallop.   Pulmonary:      Effort: Pulmonary effort is normal. No respiratory distress.      Breath sounds: Normal breath sounds. No stridor. No wheezing, rhonchi or rales.   Chest:      Chest wall: No tenderness.   Abdominal:      General: Bowel sounds are normal.      Palpations: Abdomen is soft. There is no mass.      Tenderness: There " is no abdominal tenderness. There is no guarding or rebound.   Musculoskeletal:         General: No swelling or deformity. Normal range of motion.      Cervical back: Normal range of motion and neck supple. No rigidity or tenderness.      Right lower leg: No edema.      Left lower leg: No edema.   Lymphadenopathy:      Cervical: No cervical adenopathy.   Skin:     General: Skin is warm and dry.      Coloration: Skin is not jaundiced.      Findings: No bruising or erythema.   Neurological:      General: No focal deficit present.      Mental Status: She is alert and oriented to person, place, and time. Mental status is at baseline.      Cranial Nerves: No cranial nerve deficit.      Motor: No weakness.      Coordination: Coordination normal.      Gait: Gait normal.   Psychiatric:         Mood and Affect: Mood normal.         Behavior: Behavior normal.         Thought Content: Thought content normal.         Judgment: Judgment normal.         Assessment/Plan   Problem List Items Addressed This Visit             ICD-10-CM       Cardiac and Vasculature    Primary hypertension I10     Controlled. Continue current medications and DASH diet.         Hyperlipidemia E78.5     Low cholesterol diet is advised.             Endocrine/Metabolic    Prediabetes R73.03     Low carbohydrate diet is advised.          Vitamin D deficiency E55.9     Continue Vit d supplement.         Relevant Orders    Vitamin D 25-Hydroxy,Total (for eval of Vitamin D levels)    Hypothyroidism - Primary E03.9     Clinically stable. Continue Levothyroxine.         Relevant Orders    TSH with reflex to Free T4 if abnormal    Class 2 severe obesity due to excess calories with serious comorbidity and body mass index (BMI) of 35.0 to 35.9 in adult (CMS/[facility]) E66.01, Z68.35     Weight loss is advised. Target BMI: 25. Please follow low carbohydrate diet and exercise at least 150 minutes weekly.  Nutritional consultation is available, please let me know if  interested.           It was a pleasure to see you today.  I would like to remind you about importance of a healthy lifestyle in order to improve your well-being and live longer.  Try to engage in physical activities for at least 150 minutes per week.  Eat about 10 servings of fruits and vegetables daily. My advice is 2 servings of fruits and 8 servings of vegetables.  For vegetables choose at least half of them green and at least half of them fresh.  Please avoid sugar, salt, fried food and saturated fat.    I spent a total of 30 minutes on the date of service for follow up visit, which included preparing to see the patient, face-to-face patient care, completing clinical documentation, obtaining and/or reviewing separately obtained history, performing a medically appropriate examination, counseling and educating the patient/family/caregiver, ordering medications, tests, or procedures, communicating with other health care providers (not separately reported), independently interpreting results (not separately reported), communicating results to the patient/family/caregiver, and care coordination (not separately reported).    F/up in 4 months or sooner if needed.

## 2023-12-18 DIAGNOSIS — E03.8 OTHER SPECIFIED HYPOTHYROIDISM: ICD-10-CM

## 2023-12-18 RX ORDER — LEVOTHYROXINE SODIUM 50 UG/1
50 TABLET ORAL DAILY
Qty: 90 TABLET | Refills: 0 | Status: SHIPPED | OUTPATIENT
Start: 2023-12-18 | End: 2024-03-11

## 2024-02-05 ENCOUNTER — APPOINTMENT (OUTPATIENT)
Dept: PRIMARY CARE | Facility: CLINIC | Age: 54
End: 2024-02-05
Payer: COMMERCIAL

## 2024-02-07 ENCOUNTER — APPOINTMENT (OUTPATIENT)
Dept: PRIMARY CARE | Facility: CLINIC | Age: 54
End: 2024-02-07
Payer: COMMERCIAL

## 2024-02-29 ENCOUNTER — APPOINTMENT (OUTPATIENT)
Dept: PRIMARY CARE | Facility: CLINIC | Age: 54
End: 2024-02-29
Payer: COMMERCIAL

## 2024-03-04 ENCOUNTER — APPOINTMENT (OUTPATIENT)
Dept: PRIMARY CARE | Facility: CLINIC | Age: 54
End: 2024-03-04
Payer: COMMERCIAL

## 2024-03-09 DIAGNOSIS — E03.8 OTHER SPECIFIED HYPOTHYROIDISM: ICD-10-CM

## 2024-03-11 RX ORDER — LEVOTHYROXINE SODIUM 50 UG/1
50 TABLET ORAL DAILY
Qty: 30 TABLET | Refills: 0 | Status: SHIPPED | OUTPATIENT
Start: 2024-03-11 | End: 2024-04-08

## 2024-03-21 ENCOUNTER — OFFICE VISIT (OUTPATIENT)
Dept: PRIMARY CARE | Facility: CLINIC | Age: 54
End: 2024-03-21
Payer: COMMERCIAL

## 2024-03-21 ENCOUNTER — LAB (OUTPATIENT)
Dept: LAB | Facility: LAB | Age: 54
End: 2024-03-21
Payer: COMMERCIAL

## 2024-03-21 VITALS
HEIGHT: 59 IN | DIASTOLIC BLOOD PRESSURE: 82 MMHG | RESPIRATION RATE: 16 BRPM | BODY MASS INDEX: 38.18 KG/M2 | SYSTOLIC BLOOD PRESSURE: 130 MMHG | OXYGEN SATURATION: 99 % | TEMPERATURE: 97.6 F | HEART RATE: 73 BPM | WEIGHT: 189.4 LBS

## 2024-03-21 DIAGNOSIS — R73.03 PREDIABETES: ICD-10-CM

## 2024-03-21 DIAGNOSIS — E78.2 MIXED HYPERLIPIDEMIA: ICD-10-CM

## 2024-03-21 DIAGNOSIS — E55.9 VITAMIN D DEFICIENCY: ICD-10-CM

## 2024-03-21 DIAGNOSIS — E03.9 HYPOTHYROIDISM, UNSPECIFIED TYPE: ICD-10-CM

## 2024-03-21 DIAGNOSIS — J20.9 ACUTE BRONCHITIS, UNSPECIFIED ORGANISM: Primary | ICD-10-CM

## 2024-03-21 DIAGNOSIS — M25.561 RIGHT KNEE PAIN, UNSPECIFIED CHRONICITY: ICD-10-CM

## 2024-03-21 DIAGNOSIS — I10 PRIMARY HYPERTENSION: ICD-10-CM

## 2024-03-21 DIAGNOSIS — E03.8 OTHER SPECIFIED HYPOTHYROIDISM: ICD-10-CM

## 2024-03-21 LAB
25(OH)D3 SERPL-MCNC: 32 NG/ML (ref 30–100)
ALBUMIN SERPL BCP-MCNC: 4 G/DL (ref 3.4–5)
ALP SERPL-CCNC: 59 U/L (ref 33–110)
ALT SERPL W P-5'-P-CCNC: 7 U/L (ref 7–45)
ANION GAP SERPL CALC-SCNC: 11 MMOL/L (ref 10–20)
AST SERPL W P-5'-P-CCNC: 9 U/L (ref 9–39)
BILIRUB SERPL-MCNC: 0.4 MG/DL (ref 0–1.2)
BUN SERPL-MCNC: 12 MG/DL (ref 6–23)
CALCIUM SERPL-MCNC: 9.6 MG/DL (ref 8.6–10.6)
CHLORIDE SERPL-SCNC: 102 MMOL/L (ref 98–107)
CHOLEST SERPL-MCNC: 287 MG/DL (ref 0–199)
CHOLESTEROL/HDL RATIO: 4.5
CO2 SERPL-SCNC: 30 MMOL/L (ref 21–32)
CREAT SERPL-MCNC: 0.76 MG/DL (ref 0.5–1.05)
EGFRCR SERPLBLD CKD-EPI 2021: >90 ML/MIN/1.73M*2
ERYTHROCYTE [DISTWIDTH] IN BLOOD BY AUTOMATED COUNT: 12.8 % (ref 11.5–14.5)
EST. AVERAGE GLUCOSE BLD GHB EST-MCNC: 120 MG/DL
GLUCOSE SERPL-MCNC: 92 MG/DL (ref 74–99)
HBA1C MFR BLD: 5.8 %
HCT VFR BLD AUTO: 39.3 % (ref 36–46)
HDLC SERPL-MCNC: 63.5 MG/DL
HGB BLD-MCNC: 12.7 G/DL (ref 12–16)
LDLC SERPL CALC-MCNC: 169 MG/DL
MCH RBC QN AUTO: 31.3 PG (ref 26–34)
MCHC RBC AUTO-ENTMCNC: 32.3 G/DL (ref 32–36)
MCV RBC AUTO: 97 FL (ref 80–100)
NON HDL CHOLESTEROL: 224 MG/DL (ref 0–149)
NRBC BLD-RTO: 0 /100 WBCS (ref 0–0)
PLATELET # BLD AUTO: 203 X10*3/UL (ref 150–450)
POTASSIUM SERPL-SCNC: 4.3 MMOL/L (ref 3.5–5.3)
PROT SERPL-MCNC: 6.9 G/DL (ref 6.4–8.2)
RBC # BLD AUTO: 4.06 X10*6/UL (ref 4–5.2)
SODIUM SERPL-SCNC: 139 MMOL/L (ref 136–145)
TRIGL SERPL-MCNC: 272 MG/DL (ref 0–149)
TSH SERPL-ACNC: 1.52 MIU/L (ref 0.44–3.98)
VLDL: 54 MG/DL (ref 0–40)
WBC # BLD AUTO: 7.1 X10*3/UL (ref 4.4–11.3)

## 2024-03-21 PROCEDURE — 3008F BODY MASS INDEX DOCD: CPT | Performed by: INTERNAL MEDICINE

## 2024-03-21 PROCEDURE — 85027 COMPLETE CBC AUTOMATED: CPT

## 2024-03-21 PROCEDURE — 3075F SYST BP GE 130 - 139MM HG: CPT | Performed by: INTERNAL MEDICINE

## 2024-03-21 PROCEDURE — 1036F TOBACCO NON-USER: CPT | Performed by: INTERNAL MEDICINE

## 2024-03-21 PROCEDURE — 80053 COMPREHEN METABOLIC PANEL: CPT

## 2024-03-21 PROCEDURE — 83036 HEMOGLOBIN GLYCOSYLATED A1C: CPT

## 2024-03-21 PROCEDURE — 36415 COLL VENOUS BLD VENIPUNCTURE: CPT

## 2024-03-21 PROCEDURE — 84443 ASSAY THYROID STIM HORMONE: CPT

## 2024-03-21 PROCEDURE — 99214 OFFICE O/P EST MOD 30 MIN: CPT | Performed by: INTERNAL MEDICINE

## 2024-03-21 PROCEDURE — 82306 VITAMIN D 25 HYDROXY: CPT

## 2024-03-21 PROCEDURE — 3079F DIAST BP 80-89 MM HG: CPT | Performed by: INTERNAL MEDICINE

## 2024-03-21 PROCEDURE — 80061 LIPID PANEL: CPT

## 2024-03-21 RX ORDER — AZITHROMYCIN 250 MG/1
TABLET, FILM COATED ORAL
Qty: 6 TABLET | Refills: 0 | Status: SHIPPED | OUTPATIENT
Start: 2024-03-21 | End: 2024-03-26

## 2024-03-21 ASSESSMENT — ENCOUNTER SYMPTOMS
ALLERGIC/IMMUNOLOGIC NEGATIVE: 1
SINUS PAIN: 0
EYE DISCHARGE: 0
WOUND: 0
COLOR CHANGE: 0
ARTHRALGIAS: 1
BLOOD IN STOOL: 0
FREQUENCY: 0
SHORTNESS OF BREATH: 0
ADENOPATHY: 0
PALPITATIONS: 0
CHEST TIGHTNESS: 0
NUMBNESS: 0
JOINT SWELLING: 0
SORE THROAT: 0
EYES NEGATIVE: 1
DIFFICULTY URINATING: 0
ABDOMINAL PAIN: 0
BACK PAIN: 0
CONFUSION: 0
ACTIVITY CHANGE: 0
NECK PAIN: 0
SINUS PRESSURE: 0
GASTROINTESTINAL NEGATIVE: 1
DIARRHEA: 0
COUGH: 1
FLANK PAIN: 0
TREMORS: 0
EYE PAIN: 0
POLYPHAGIA: 0
UNEXPECTED WEIGHT CHANGE: 0
DYSURIA: 0
VOMITING: 0
WEAKNESS: 0
EYE REDNESS: 0
SLEEP DISTURBANCE: 0
LIGHT-HEADEDNESS: 0
FATIGUE: 1
NECK STIFFNESS: 0
FACIAL ASYMMETRY: 0
APPETITE CHANGE: 0
AGITATION: 0
MYALGIAS: 0
DIZZINESS: 0
NERVOUS/ANXIOUS: 0
SPEECH DIFFICULTY: 0
STRIDOR: 0
SEIZURES: 0
NAUSEA: 0
TROUBLE SWALLOWING: 0
HEADACHES: 0
PSYCHIATRIC NEGATIVE: 1
BRUISES/BLEEDS EASILY: 0
NEUROLOGICAL NEGATIVE: 1
WHEEZING: 0
CARDIOVASCULAR NEGATIVE: 1
POLYDIPSIA: 0
VOICE CHANGE: 0
HEMATOLOGIC/LYMPHATIC NEGATIVE: 1
ENDOCRINE NEGATIVE: 1
CONSTIPATION: 0
HALLUCINATIONS: 0

## 2024-03-21 NOTE — PROGRESS NOTES
Subjective   Patient ID: Tammy Rena Lepley is a 53 y.o. female who presents for Follow-up (Patient is here for 4 month follow up patient has right  knee pain when she goes up or down the stairs ).  HPI      Patient has been feeling pretty good and has been complaint with prescribed medications.    Patient was seen at [facility] ER a week ago with cough, SOB, congestion, diagnosed with influenza A and bronchitis.   I reviewed available ER records and discharge orders. Discussed  patient's condition in details. I reviewed discharge medications, reconciled discharge medications and compared with outpatient medication list. All medications changes were discussed with patient in details. Current medication list was updated to reflect recent changes.   She feels better although still congested, still coughing.    C/o right knee pain which interferes with walking.     We reviewed and discussed details of recent blood work: CBC, CMP, TSH, Lipid panel, Hb A1c, Vit D done in 2023.  Results within acceptable range except elevated cholesterol, HbA1c: 5.8.    Had CT CAC in May 2023 and her score was zero.     We discussed starting medication to lower cholesterol but she would prefer to incorporate more lifestyle changes first.    Patient has been following with GYN at [facility].    Review of Systems   Constitutional:  Positive for fatigue. Negative for activity change, appetite change and unexpected weight change.   HENT:  Positive for congestion. Negative for ear discharge, ear pain, hearing loss, mouth sores, nosebleeds, sinus pressure, sinus pain, sore throat, trouble swallowing and voice change.    Eyes: Negative.  Negative for pain, discharge, redness and visual disturbance.   Respiratory:  Positive for cough. Negative for chest tightness, shortness of breath, wheezing and stridor.    Cardiovascular: Negative.  Negative for chest pain, palpitations and leg swelling.   Gastrointestinal: Negative.  Negative for abdominal pain,  "blood in stool, constipation, diarrhea, nausea and vomiting.   Endocrine: Negative.  Negative for polydipsia, polyphagia and polyuria.   Genitourinary: Negative.  Negative for difficulty urinating, dysuria, flank pain, frequency and urgency.   Musculoskeletal:  Positive for arthralgias. Negative for back pain, gait problem, joint swelling, myalgias, neck pain and neck stiffness.   Skin: Negative.  Negative for color change, rash and wound.   Allergic/Immunologic: Negative.  Negative for environmental allergies, food allergies and immunocompromised state.   Neurological: Negative.  Negative for dizziness, tremors, seizures, syncope, facial asymmetry, speech difficulty, weakness, light-headedness, numbness and headaches.   Hematological: Negative.  Negative for adenopathy. Does not bruise/bleed easily.   Psychiatric/Behavioral: Negative.  Negative for agitation, behavioral problems, confusion, hallucinations, sleep disturbance and suicidal ideas. The patient is not nervous/anxious.    All other systems reviewed and are negative.      Objective     Review of systems was performed and all systems were negative except what in HPI    /82   Pulse 73   Temp 36.4 °C (97.6 °F) (Temporal)   Resp 16   Ht 1.499 m (4' 11\")   Wt 85.9 kg (189 lb 6.4 oz)   SpO2 99%   BMI 38.25 kg/m²    Physical Exam  Vitals and nursing note reviewed.   Constitutional:       General: She is not in acute distress.     Appearance: Normal appearance.   HENT:      Head: Normocephalic and atraumatic.      Right Ear: Tympanic membrane, ear canal and external ear normal.      Left Ear: Tympanic membrane, ear canal and external ear normal.      Nose: Nose normal. No congestion or rhinorrhea.      Mouth/Throat:      Mouth: Mucous membranes are moist.      Pharynx: Oropharynx is clear.   Eyes:      General:         Right eye: No discharge.         Left eye: No discharge.      Extraocular Movements: Extraocular movements intact.      " Conjunctiva/sclera: Conjunctivae normal.      Pupils: Pupils are equal, round, and reactive to light.   Cardiovascular:      Rate and Rhythm: Normal rate and regular rhythm.      Pulses: Normal pulses.      Heart sounds: Normal heart sounds. No murmur heard.     No friction rub. No gallop.   Pulmonary:      Effort: Pulmonary effort is normal. No respiratory distress.      Breath sounds: No stridor. Rhonchi present. No wheezing or rales.   Chest:      Chest wall: No tenderness.   Abdominal:      General: Bowel sounds are normal.      Palpations: Abdomen is soft. There is no mass.      Tenderness: There is no abdominal tenderness. There is no guarding or rebound.   Musculoskeletal:         General: No swelling or deformity. Normal range of motion.      Cervical back: Normal range of motion and neck supple. No rigidity or tenderness.      Right lower leg: No edema.      Left lower leg: No edema.   Lymphadenopathy:      Cervical: No cervical adenopathy.   Skin:     General: Skin is warm and dry.      Coloration: Skin is not jaundiced.      Findings: No bruising or erythema.   Neurological:      General: No focal deficit present.      Mental Status: She is alert and oriented to person, place, and time. Mental status is at baseline.      Cranial Nerves: No cranial nerve deficit.      Motor: No weakness.      Coordination: Coordination normal.      Gait: Gait normal.   Psychiatric:         Mood and Affect: Mood normal.         Behavior: Behavior normal.         Thought Content: Thought content normal.         Judgment: Judgment normal.         Assessment/Plan   Problem List Items Addressed This Visit             ICD-10-CM       Cardiac and Vasculature    Primary hypertension I10     Controlled. Continue current medications and DASH diet.         Hyperlipidemia E78.5       Endocrine/Metabolic    Prediabetes R73.03     Low carbohydrate diet is advised.          Vitamin D deficiency E55.9     Continue Vit d supplement.          Hypothyroidism E03.9     Clinically stable. Continue Levothyroxine.            Musculoskeletal and Injuries    Right knee pain M25.561    Relevant Orders    Referral to Orthopaedic Surgery       Pulmonary and Pneumonias    Acute bronchitis - Primary J20.9    Relevant Medications    azithromycin (Zithromax) 250 mg tablet   It was a pleasure to see you today.  I would like to remind you about importance of a healthy lifestyle in order to improve your well-being and live longer.  Try to engage in physical activities for at least 150 minutes per week.  Eat about 10 servings of fruits and vegetables daily. My advice is 2 servings of fruits and 8 servings of vegetables.  For vegetables choose at least half of them green and at least half of them fresh.  Please avoid sugar, salt, fried food and saturated fat.    I spent a total of 30 minutes on the date of service for follow up visit, which included preparing to see the patient, face-to-face patient care, completing clinical documentation, obtaining and/or reviewing separately obtained history, performing a medically appropriate examination, counseling and educating the patient/family/caregiver, ordering medications, tests, or procedures, communicating with other health care providers (not separately reported), independently interpreting results (not separately reported), communicating results to the patient/family/caregiver, and care coordination (not separately reported).    F/up in 3 months or sooner if needed.

## 2024-04-08 DIAGNOSIS — E03.8 OTHER SPECIFIED HYPOTHYROIDISM: ICD-10-CM

## 2024-04-08 RX ORDER — LEVOTHYROXINE SODIUM 50 UG/1
50 TABLET ORAL DAILY
Qty: 90 TABLET | Refills: 1 | Status: SHIPPED | OUTPATIENT
Start: 2024-04-08

## 2024-04-10 ENCOUNTER — APPOINTMENT (OUTPATIENT)
Dept: PRIMARY CARE | Facility: CLINIC | Age: 54
End: 2024-04-10
Payer: COMMERCIAL

## 2024-04-16 ENCOUNTER — TELEMEDICINE (OUTPATIENT)
Dept: PRIMARY CARE | Facility: CLINIC | Age: 54
End: 2024-04-16
Payer: COMMERCIAL

## 2024-04-16 VITALS — BODY MASS INDEX: 38.38 KG/M2 | WEIGHT: 190 LBS

## 2024-04-16 DIAGNOSIS — R73.03 PREDIABETES: ICD-10-CM

## 2024-04-16 DIAGNOSIS — E78.2 MIXED HYPERLIPIDEMIA: ICD-10-CM

## 2024-04-16 DIAGNOSIS — E66.01 CLASS 2 SEVERE OBESITY DUE TO EXCESS CALORIES WITH SERIOUS COMORBIDITY AND BODY MASS INDEX (BMI) OF 38.0 TO 38.9 IN ADULT (MULTI): Primary | ICD-10-CM

## 2024-04-16 DIAGNOSIS — E03.9 HYPOTHYROIDISM, UNSPECIFIED TYPE: ICD-10-CM

## 2024-04-16 DIAGNOSIS — I10 PRIMARY HYPERTENSION: ICD-10-CM

## 2024-04-16 PROCEDURE — 3008F BODY MASS INDEX DOCD: CPT | Performed by: INTERNAL MEDICINE

## 2024-04-16 PROCEDURE — 99214 OFFICE O/P EST MOD 30 MIN: CPT | Performed by: INTERNAL MEDICINE

## 2024-04-16 PROCEDURE — 1036F TOBACCO NON-USER: CPT | Performed by: INTERNAL MEDICINE

## 2024-04-16 RX ORDER — SEMAGLUTIDE 0.25 MG/.5ML
0.25 INJECTION, SOLUTION SUBCUTANEOUS
Qty: 2 ML | Refills: 0 | Status: SHIPPED | OUTPATIENT
Start: 2024-04-21 | End: 2024-04-19 | Stop reason: SDUPTHER

## 2024-04-16 ASSESSMENT — ENCOUNTER SYMPTOMS
CONSTITUTIONAL NEGATIVE: 1
EYE PAIN: 0
ALLERGIC/IMMUNOLOGIC NEGATIVE: 1
SORE THROAT: 0
SEIZURES: 0
VOMITING: 0
EYES NEGATIVE: 1
SHORTNESS OF BREATH: 0
FLANK PAIN: 0
SINUS PRESSURE: 0
APPETITE CHANGE: 0
HALLUCINATIONS: 0
CONSTIPATION: 0
DIFFICULTY URINATING: 0
POLYDIPSIA: 0
BLOOD IN STOOL: 0
DIZZINESS: 0
NECK PAIN: 0
SLEEP DISTURBANCE: 0
CARDIOVASCULAR NEGATIVE: 1
NUMBNESS: 0
NERVOUS/ANXIOUS: 0
VOICE CHANGE: 0
DYSURIA: 0
POLYPHAGIA: 0
FEVER: 0
CONFUSION: 0
TREMORS: 0
DIARRHEA: 0
RESPIRATORY NEGATIVE: 1
COLOR CHANGE: 0
ACTIVITY CHANGE: 0
MYALGIAS: 0
STRIDOR: 0
WHEEZING: 0
ABDOMINAL PAIN: 0
NAUSEA: 0
ARTHRALGIAS: 1
WOUND: 0
EYE DISCHARGE: 0
NECK STIFFNESS: 0
FREQUENCY: 0
SPEECH DIFFICULTY: 0
NEUROLOGICAL NEGATIVE: 1
HEMATOLOGIC/LYMPHATIC NEGATIVE: 1
TROUBLE SWALLOWING: 0
BACK PAIN: 0
PSYCHIATRIC NEGATIVE: 1
PALPITATIONS: 0
GASTROINTESTINAL NEGATIVE: 1
FACIAL ASYMMETRY: 0
ENDOCRINE NEGATIVE: 1
EYE REDNESS: 0
CHILLS: 0
BRUISES/BLEEDS EASILY: 0
UNEXPECTED WEIGHT CHANGE: 0
CHEST TIGHTNESS: 0
JOINT SWELLING: 0
WEAKNESS: 0
HEADACHES: 0
LIGHT-HEADEDNESS: 0
ADENOPATHY: 0
AGITATION: 0
COUGH: 0
SINUS PAIN: 0

## 2024-04-16 NOTE — PROGRESS NOTES
Subjective   Patient ID: Tammy Rena Lepley is a 53 y.o. female who presents for virtual (Patient is having a virtual today due to discuss medication ).  HPI  Virtual or Telephone Consent    An interactive audio and video telecommunication system which permits real time communications between the patient (at the originating site) and provider (at the distant site) was utilized to provide this telehealth service.   Verbal consent was requested and obtained from Tammy Rena Lepley on this date, 04/16/24 for a telehealth visit.     Patient has been feeling pretty good and has been complaint with prescribed medications.    We reviewed and discussed details of recent blood work: CBC, CMP, TSH, Lipid panel, Hb A1c, Vit D done in March 2024.  Results within acceptable range except elevated cholesterol,TG and HbA1c: 5.8.    Patient's Bronchitis/URI sx improved after recent treatment with antibiotic.     Patient has been concerned about her weight, elevated cholesterol, TG, HbA1c.    She has h/o borderline DM, HTN, HLD, Hypothyroidism, menopause at age 50, weight problems, varicose veins, OA,  Gestational diabetes with second pregnancy.  Fam hx:  Father: CAD, s/stent, CVA, blood clots  Sister: MI at age 30, blood clots  Heart problems on father's side of family  Mother: CHF    We discussed treatment options for weight loss. Patient tried but so far has not been successful with diet and exercise for weight loss.    Over last few years she tried [employer] diet  for 6 months, Weight Watchers for 12 months, tried low carbohydrate and low calore diet, took Metformin for few months but was not able  tolerate it due to GI side effects.  She was following with weight management physician at [facility], advised Metformin. Did not lose much weight.   She has been exercising/walking daily.  Despite all above she was not able to lose more than 5-10 lbs, and was gaining it back shortly after losing.    Patient  understands that healthy low  calorie diet (1800 nicki) and exercise at least 150 min weekly is recommended  in addition to pharmacological treatment.  Patient agreed to try semaglutide.  We discussed contraindications including medullary thyroid ca, and Multiple Neoplasia syndrome type 2.  Also possible side effects discussed: nausea, anaphylaxis, angioedema, pancreatitis, gallbladder disease  Patient will let me know if any any issues arise after injections started.        Review of Systems   Constitutional: Negative.  Negative for activity change, appetite change, chills, fever and unexpected weight change.   HENT: Negative.  Negative for congestion, ear discharge, ear pain, hearing loss, mouth sores, nosebleeds, sinus pressure, sinus pain, sore throat, trouble swallowing and voice change.    Eyes: Negative.  Negative for pain, discharge, redness and visual disturbance.   Respiratory: Negative.  Negative for cough, chest tightness, shortness of breath, wheezing and stridor.    Cardiovascular: Negative.  Negative for chest pain, palpitations and leg swelling.   Gastrointestinal: Negative.  Negative for abdominal pain, blood in stool, constipation, diarrhea, nausea and vomiting.   Endocrine: Negative.  Negative for polydipsia, polyphagia and polyuria.   Genitourinary: Negative.  Negative for difficulty urinating, dysuria, flank pain, frequency and urgency.   Musculoskeletal:  Positive for arthralgias. Negative for back pain, gait problem, joint swelling, myalgias, neck pain and neck stiffness.   Skin: Negative.  Negative for color change, rash and wound.   Allergic/Immunologic: Negative.  Negative for environmental allergies, food allergies and immunocompromised state.   Neurological: Negative.  Negative for dizziness, tremors, seizures, syncope, facial asymmetry, speech difficulty, weakness, light-headedness, numbness and headaches.   Hematological: Negative.  Negative for adenopathy. Does not bruise/bleed easily.   Psychiatric/Behavioral:  Negative.  Negative for agitation, behavioral problems, confusion, hallucinations, sleep disturbance and suicidal ideas. The patient is not nervous/anxious.    All other systems reviewed and are negative.      Objective     Review of systems was performed and all systems were negative except what in HPI    Wt 86.2 kg (190 lb)   BMI 38.38 kg/m²    Physical Exam  Constitutional:       Appearance: Normal appearance. She is obese.   Pulmonary:      Effort: Pulmonary effort is normal.   Neurological:      Mental Status: She is alert and oriented to person, place, and time.   Psychiatric:         Mood and Affect: Mood normal.         Behavior: Behavior normal.         Thought Content: Thought content normal.         Judgment: Judgment normal.         Assessment/Plan   Problem List Items Addressed This Visit             ICD-10-CM       Cardiac and Vasculature    Primary hypertension I10     Controlled. Continue current medications and DASH diet.         Hyperlipidemia E78.5     Low cholesterol diet is advised.             Endocrine/Metabolic    Prediabetes R73.03     Low carbohydrate diet is advised.          Hypothyroidism E03.9     Clinically stable. Continue Levothyroxine.          Other Visit Diagnoses         Codes    Class 2 severe obesity due to excess calories with serious comorbidity and body mass index (BMI) of 38.0 to 38.9 in adult (Multi)    -  Primary E66.01, Z68.38    Relevant Medications    semaglutide, weight loss, (Wegovy) 0.25 mg/0.5 mL pen injector (Start on 4/21/2024)    Other Relevant Orders    Follow Up In [facility] - Pharmacy          It was a pleasure to see you today.  I would like to remind you about importance of a healthy lifestyle in order to improve your well-being and live longer.  Try to engage in physical activities for at least 150 minutes per week.  Eat about 10 servings of fruits and vegetables daily. My advice is 2 servings of fruits and 8 servings of vegetables.  For  vegetables choose at least half of them green and at least half of them fresh.  Please avoid sugar, salt, fried food and saturated fat.    I spent a total of 35 minutes on the date of service for follow up visit, which included preparing to see the patient, face-to-face patient care, completing clinical documentation, obtaining and/or reviewing separately obtained history, performing a medically appropriate examination, counseling and educating the patient/family/caregiver, ordering medications, tests, or procedures, communicating with other health care providers (not separately reported), independently interpreting results (not separately reported), communicating results to the patient/family/caregiver, and care coordination (not separately reported).      F/up in 3 months or sooner if needed.

## 2024-04-17 ENCOUNTER — TELEPHONE (OUTPATIENT)
Dept: PHARMACY | Facility: HOSPITAL | Age: 54
End: 2024-04-17
Payer: COMMERCIAL

## 2024-04-17 NOTE — TELEPHONE ENCOUNTER
Toriteddy Matute Emirtimo was contacted to schedule her initial pharmacy appointment. I noticed that the [employer] required a prior authorization and submitted it earlier today, which has been approved. Patient was made aware of the approval and her out of pocket cost.     Key: [id number]  Approval Duration: 7 months  Date of expiration: 11/17/2024    Patient was scheduled for an initial telephone appointment for [employer] education for this Friday 04/19/24.     Please contact clinical pharmacy with any further questions. Thank you.    Gerry Encarnacion, PharmD  P: [phone]

## 2024-04-18 NOTE — PROGRESS NOTES
Patient is sent at the request of Laura Saldaña M* for my opinion regarding weight management.  My final recommendations will be communicated back to the requesting provider by way of shared medical record.    Subjective     Tammy Rena Lepley is a 53 y.o. female with class 2 obesity as evidenced by her most recent in-office weight of 86.2 kg and calculated BMI of 38.38 kg/m2 on 04/16/24 which indicates her for medication assisted weight loss. Weight related comorbidities include hypertension and hyperlipidemia. She was prescribed Wegovy 0.25 mg by Dr. Mueller on 03/21/24, however it required a prior authorization which was approved on 04/17/24. We are meeting today for her initial pharmacy visit for Wegovy education and to discuss how the process of titration and follow-up looks.     Past Medical History:  She has no past medical history on file.    Past Surgical History:  She has no past surgical history on file.    Social History:  She reports that she has never smoked. She has never been exposed to tobacco smoke. She has never used smokeless tobacco. She reports that she does not currently use alcohol. She reports that she does not use drugs.    Family History:  No family history on file.    Allergies:  Patient has no known allergies.    Previous Weight Loss Attempts:   Has tried Mary Jaswinder - states she previously has done well with this but was unable to keep up with it due to cost   Weight Watchers off and on, states this helps but wasn't able to keep up with it consistently long-term  States that obesity does run in her family (mother, sister, grandmothers)  Has tried intermittent fasting in the past   Has tried general diets and exercising more   Has seen a weight loss doctor in the past, took Metformin but states she didn't like the way it made her feel (noted GI upset and brain fog)    Current diet:   Will eat 2-3 meals per day, will either skip breakfast or lunch depending on how busy her workday is    Breakfast: banana and breakfast bar with yogurt/fruit if she is going to eat, will have waffles with fruit and fat free syrup; will typically only have a full meal for breakfast if they go out   Lunch: varies - does pack her lunch; has been trying to take a salad or turkey/chicken sandwich, will also have the lean cuisine or healthy frozen microwave meals   Dinner: if she cooks she cooks for her whole family which makes it challenging to stick to a diet, will typically have a protein, vegetable, sometimes more pasta and carbs than other times   Snacks: occasionally will snack after dinner - will have a snack bar or a cake, sometimes chips   Fluids: coffee and water, once in a while will have a soda  Generally eats smaller portion sizes  Eats out about once per week     Current exercise:   Walks a lot at work   Has not gotten into any other activity  Does have a fitness set with weights and an elliptical   Has been having some trouble with her knee after falling, wants to get it checked before using the elliptical machine more    The patient does not have a known family history of diabetes. Was diagnosed with gestational diabetes with her second pregnancy.     Adverse Effects: none reported      Objective     Medication Reconciliation:   Added:   Calcium carbonate-vitamin D3 600 mg-20 mcg once daily     Current Outpatient Medications on File Prior to Visit   Medication Sig Dispense Refill    calcium carbonate-vitamin D3 (Caltrate 600 plus D) 600 mg-20 mcg (800 unit) tablet,chewable Chew 1 tablet once daily.      hydroCHLOROthiazide (HYDRODiuril) 25 mg tablet Take 1 tablet (25 mg) by mouth once daily. 90 tablet 3    ibuprofen (Motrin) capsule Take 1 capsule (200 mg) by mouth.      levothyroxine (Synthroid, Levoxyl) 50 mcg tablet TAKE 1 TABLET BY MOUTH ONCE DAILY. 90 tablet 1    Osphena 60 mg tablet Take 1 tablet by mouth once daily.      [DISCONTINUED] semaglutide, weight loss, (Wegovy) 0.25 mg/0.5 mL pen injector  "Inject 0.25 mg under the skin 1 (one) time per week for 4 doses. (Patient not taking: Reported on 4/19/2024 Do not start before April 21, 2024.) 2 mL 0     No current facility-administered medications on file prior to visit.      Last Recorded Vitals:  BP Readings from Last 6 Encounters:   03/21/24 130/82   10/04/23 115/81   05/15/23 133/87   04/10/23 138/86     Wt Readings from Last 6 Encounters:   04/16/24 86.2 kg (190 lb)   03/21/24 85.9 kg (189 lb 6.4 oz)   10/04/23 87.2 kg (192 lb 3.2 oz)   05/15/23 89.4 kg (197 lb)   04/10/23 90.9 kg (200 lb 6.4 oz)     Estimated body mass index is 38.38 kg/m² as calculated from the following:    Height as of 3/21/24: 1.499 m (4' 11\").    Weight as of 4/16/24: 86.2 kg (190 lb).    Weight Loss Pharmacotherapy:  None (has not yet started Wegovy)    Historical Weight Loss Pharmacotherapy:   Metformin (didn't tolerate it well and did not feel that it did a lot to help her lose weight)    Primary/Secondary Prevention   Statin? No    Pertinent PMH Review:  PMH of Pancreatitis: No  PMH of Retinopathy: No  PMH of MTC/MEN 2: No    Lab Review  Lab Results   Component Value Date    BILITOT 0.4 03/21/2024    CALCIUM 9.6 03/21/2024    CO2 30 03/21/2024     03/21/2024    CREATININE 0.76 03/21/2024    GLUCOSE 92 03/21/2024    ALKPHOS 59 03/21/2024    K 4.3 03/21/2024    PROT 6.9 03/21/2024     03/21/2024    AST 9 03/21/2024    ALT 7 03/21/2024    BUN 12 03/21/2024    ANIONGAP 11 03/21/2024    ALBUMIN 4.0 03/21/2024    GFRF 77 04/22/2023     Lab Results   Component Value Date    TRIG 272 (H) 03/21/2024    CHOL 287 (H) 03/21/2024    LDLCALC 169 (H) 03/21/2024    HDL 63.5 03/21/2024     Lab Results   Component Value Date    HGBA1C 5.8 (H) 03/21/2024    HGBA1C 5.8 (A) 04/22/2023    HGBA1C 6.0 (H) 03/12/2022     No components found for: \"UACR\"  The 10-year ASCVD risk score (Marisabel MCRAE, et al., 2019) is: 2%    Values used to calculate the score:      Age: 53 years      Sex: Female      " Is Non- : No      Diabetic: No      Tobacco smoker: No      Systolic Blood Pressure: 130 mmHg      Is BP treated: No      HDL Cholesterol: 63.5 mg/dL      Total Cholesterol: 287 mg/dL    Health Maintenance:   Lipid Panel:  mg/dL,  mg/dL 03/21/24  Influenza Immunization: typically does not receive, but had the flu this year so she may try again next season      Drug Interactions:  No significant drug-drug interactions exist requiring adjustment to medication therapy.     Patient Education:     Completed weight loss education for the administration of once weekly Wegovy:   Instructed the patient that Wegovy must be refrigerated If necessary, the pen may be kept at room temperature for up to 28 days.   Remove the pen from the refridgerator. Keep the pen cap on until you are ready to inject.   Check the pen label to make sure that it is the correct medication and dose. Also check to make sure that the solution is not cloudy, discolored or have particles in it.   Prior to administration wash your hands.   Choose your injection site either your abdomen or thigh (if someone else is giving the injection the upper arm can also be used).   Ensure to change (rotate) injection sites each week. You can use the same area of the body but inject in a different part of that area.   One the injection site has been selected use an alcohol swab to clean off the area.   Remove the grey cap and press firmly onto the injection site.   Push the pen against the skin until the yellow bar has stopped moving. You will hear two clicks; one indicates that the injection has started, and the other indicates an ongoing injection. The injection process will take about 10 seconds.  Do not rub the area after administration   Remove pen and discard in a sharps container (such as or coffee canister)   Injections should be done on the same day each week, if you forget you have up to 5 days to  take your dose.      Patient also educated on common side effects and warnings:    Increased satiety is common  Stomach upset such as stomach cramping, nausea, constipation, etc which can be precipitated by eating fatty greasy foods and overeating  Discussed risks of GLP1ra including risk of pancreatitis, MTC and worsening of DR  Also discussed risks of gastroparesis and gastroparalysis as this is a common discussion point in the news - patient was informed that this is rare and they have no risk factors increasing their risk for developing these issues     Assessment/Plan   Problem List Items Addressed This Visit       Class 2 severe obesity due to excess calories with serious comorbidity and body mass index (BMI) of 35.0 to 35.9 in adult (Multi)     Tammy Rena Lepley has class 2 obesity as evidenced by her most-recent in-office weight of 86.2 kg and calculated BMI of 38.38 kg/m2 on 04/16/24 which indicates patient for medication assisted weight loss with weight related comorbidities of hypertension and hyperlipidemia. She was prescribed Wegovy 0.25 mg once weekly by Dr. Mueller on 03/21/24 but has not started it yet due to it requiring a PA which has now been approved and her typical pharmacy not having it in stock.   START: Wegovy 0.25 mg under the skin once weekly (prescription submitted to [facility] to be delivered to patient's home)  Patient education:   Patient was educated on the administration of Wegovy as described above. She was also encouraged to go to [email or web address]'s website to find instructional videos to help with visual education since our appointment was completed over the phone.   She was also educated on common side effects and more severe warnings on the medications and was informed that she does not have any risk factors for more severe side effects.   Medications are dosed appropriately per patient's most recent renal and hepatic function.   We will follow-up in ~4 weeks to discuss patient's tolerability to  the 0.25 mg dose and to discuss titrating up to the 0.5 mg once weekly dose at that time. She was given my contact information and encouraged to reach out with any questions/issues/concerns prior to our next follow-up.           Other Visit Diagnoses       Class 2 severe obesity due to excess calories with serious comorbidity and body mass index (BMI) of 38.0 to 38.9 in adult (Multi)    -  Primary    Relevant Medications    semaglutide, weight loss, (Wegovy) 0.25 mg/0.5 mL pen injector (Start on 4/21/2024)    Other Relevant Orders    Follow Up In [facility] - Pharmacy          Pharmacy Follow-Up: 05/14/2024  PCP Follow-Up: None currently scheduled     Gerry Encarnacion, PharmD     Continue all meds under the continuation of care with the referring provider and clinical pharmacy team.

## 2024-04-19 ENCOUNTER — HOSPITAL ENCOUNTER (OUTPATIENT)
Dept: RADIOLOGY | Facility: CLINIC | Age: 54
Discharge: HOME | End: 2024-04-19
Payer: COMMERCIAL

## 2024-04-19 ENCOUNTER — OFFICE VISIT (OUTPATIENT)
Dept: ORTHOPEDIC SURGERY | Facility: CLINIC | Age: 54
End: 2024-04-19
Payer: COMMERCIAL

## 2024-04-19 ENCOUNTER — TELEMEDICINE (OUTPATIENT)
Dept: PHARMACY | Facility: HOSPITAL | Age: 54
End: 2024-04-19
Payer: COMMERCIAL

## 2024-04-19 DIAGNOSIS — E66.01 CLASS 2 SEVERE OBESITY DUE TO EXCESS CALORIES WITH SERIOUS COMORBIDITY AND BODY MASS INDEX (BMI) OF 35.0 TO 35.9 IN ADULT (MULTI): ICD-10-CM

## 2024-04-19 DIAGNOSIS — M25.561 RIGHT KNEE PAIN, UNSPECIFIED CHRONICITY: ICD-10-CM

## 2024-04-19 DIAGNOSIS — E66.01 CLASS 2 SEVERE OBESITY DUE TO EXCESS CALORIES WITH SERIOUS COMORBIDITY AND BODY MASS INDEX (BMI) OF 38.0 TO 38.9 IN ADULT (MULTI): Primary | ICD-10-CM

## 2024-04-19 DIAGNOSIS — M17.11 PRIMARY OSTEOARTHRITIS OF RIGHT KNEE: Primary | ICD-10-CM

## 2024-04-19 PROCEDURE — 73564 X-RAY EXAM KNEE 4 OR MORE: CPT | Mod: RT

## 2024-04-19 PROCEDURE — RXMED WILLOW AMBULATORY MEDICATION CHARGE

## 2024-04-19 PROCEDURE — 3008F BODY MASS INDEX DOCD: CPT | Performed by: ORTHOPAEDIC SURGERY

## 2024-04-19 PROCEDURE — 99213 OFFICE O/P EST LOW 20 MIN: CPT | Performed by: ORTHOPAEDIC SURGERY

## 2024-04-19 PROCEDURE — 1036F TOBACCO NON-USER: CPT | Performed by: ORTHOPAEDIC SURGERY

## 2024-04-19 PROCEDURE — 73564 X-RAY EXAM KNEE 4 OR MORE: CPT | Mod: RIGHT SIDE | Performed by: RADIOLOGY

## 2024-04-19 RX ORDER — SEMAGLUTIDE 0.25 MG/.5ML
0.25 INJECTION, SOLUTION SUBCUTANEOUS
Qty: 2 ML | Refills: 0 | Status: SHIPPED | OUTPATIENT
Start: 2024-04-21 | End: 2024-05-14 | Stop reason: DRUGHIGH

## 2024-04-19 RX ORDER — CALCIUM CARBONATE/VITAMIN D3 600 MG-20
1 TABLET,CHEWABLE ORAL DAILY
COMMUNITY

## 2024-04-19 NOTE — ASSESSMENT & PLAN NOTE
Tammy Rena Lepley has class 2 obesity as evidenced by her most-recent in-office weight of 86.2 kg and calculated BMI of 38.38 kg/m2 on 04/16/24 which indicates patient for medication assisted weight loss with weight related comorbidities of hypertension and hyperlipidemia. She was prescribed Wegovy 0.25 mg once weekly by Dr. Mueller on 03/21/24 but has not started it yet due to it requiring a PA which has now been approved and her typical pharmacy not having it in stock.   START: Wegovy 0.25 mg under the skin once weekly (prescription submitted to [facility] to be delivered to patient's home)  Patient education:   Patient was educated on the administration of Wegovy as described above. She was also encouraged to go to [employer] website to find instructional videos to help with visual education since our appointment was completed over the phone.   She was also educated on common side effects and more severe warnings on the medications and was informed that she does not have any risk factors for more severe side effects.   Medications are dosed appropriately per patient's most recent renal and hepatic function.   We will follow-up in ~4 weeks to discuss patient's tolerability to the 0.25 mg dose and to discuss titrating up to the 0.5 mg once weekly dose at that time. She was given my contact information and encouraged to reach out with any questions/issues/concerns prior to our next follow-up.

## 2024-04-19 NOTE — PROGRESS NOTES
Patient is a 53-year-old female presents today for evaluation of right knee pain.  She had an episode a few months ago where she slipped on the steps count of hyperflexed her knee.  She took Advil occasionally.  She is also use lidocaine patches.  She is never any surgery on the knee.  She never had injections.  He did try wearing a brace as well.  She does not complain of significant instability or mechanical symptoms.  She is overweight with a BMI of 39.    Right knee:  AAOx3, NAD, walks with a non-antalgic gait  valgus allignment  Range of motion lacks 5 degrees of full extension and flexes to 115 degrees  Stable to varus/valgus/anterior/posterior stress through out the range of motion  Slight laxity with valgus stress  Diffuse lateral joint line tenderness to palpation  Mild effusion  SILT in a silvio/saph/per/tib distribution  5/5 knee extension/df/pf/ehl  ½ dorsalis pedis and posterior tibial pulse  no popliteal lymphadenopathy  no other overlying lesions  mood: euthymic  Respirations non labored    Plain films were reviewed by myself and today.  She has very mild osteoarthritis evidenced by some early joint space narrowing and early osteophytic change otherwise negative for osseous or soft tissue abnormality.    We discussed further conservative treatment including anti-inflammatories, topical anti-inflammatory gel, weight loss and injections.  She does not require any further treatment at today's visit.  She is relatively asymptomatic and her exam is benign.  She may be a candidate for cortisone injections in the future.  Will see how she does over time.  All of her questions were answered.    This note was created using voice recognition software and was not corrected for typographical or grammatical errors.

## 2024-04-23 ENCOUNTER — PHARMACY VISIT (OUTPATIENT)
Dept: PHARMACY | Facility: CLINIC | Age: 54
End: 2024-04-23
Payer: COMMERCIAL

## 2024-05-14 ENCOUNTER — TELEMEDICINE (OUTPATIENT)
Dept: PHARMACY | Facility: HOSPITAL | Age: 54
End: 2024-05-14
Payer: COMMERCIAL

## 2024-05-14 DIAGNOSIS — E66.01 CLASS 2 SEVERE OBESITY DUE TO EXCESS CALORIES WITH SERIOUS COMORBIDITY AND BODY MASS INDEX (BMI) OF 38.0 TO 38.9 IN ADULT (MULTI): ICD-10-CM

## 2024-05-14 DIAGNOSIS — E66.01 CLASS 2 SEVERE OBESITY DUE TO EXCESS CALORIES WITH SERIOUS COMORBIDITY AND BODY MASS INDEX (BMI) OF 38.0 TO 38.9 IN ADULT (MULTI): Primary | ICD-10-CM

## 2024-05-14 DIAGNOSIS — E66.01 CLASS 2 SEVERE OBESITY DUE TO EXCESS CALORIES WITH SERIOUS COMORBIDITY AND BODY MASS INDEX (BMI) OF 35.0 TO 35.9 IN ADULT (MULTI): ICD-10-CM

## 2024-05-14 PROCEDURE — RXMED WILLOW AMBULATORY MEDICATION CHARGE

## 2024-05-14 RX ORDER — SEMAGLUTIDE 0.5 MG/.5ML
0.5 INJECTION, SOLUTION SUBCUTANEOUS
Qty: 2 ML | Refills: 0 | Status: SHIPPED | OUTPATIENT
Start: 2024-05-19 | End: 2024-06-11 | Stop reason: DRUGHIGH

## 2024-05-14 RX ORDER — SEMAGLUTIDE 0.25 MG/.5ML
0.25 INJECTION, SOLUTION SUBCUTANEOUS
Qty: 2 ML | Refills: 0 | OUTPATIENT
Start: 2024-05-14

## 2024-05-14 NOTE — ASSESSMENT & PLAN NOTE
Tammy Rena Lepley has class 2 obesity as evidenced by her most-recent in-office weight of 86.2 kg and calculated BMI of 38.38 kg/m2 on 04/16/24 which indicates patient for medication assisted weight loss with weight related comorbidities of hypertension and hyperlipidemia. She was started on Wegovy 0.25 mg once weekly at last pharmacy encounter and has overall tolerated it well, only reporting a stomach ache after her dose that improved throughout the week after. She is agreeable to increasing up to the 0.5 mg once weekly dose.   CHANGE: Wegovy 0.5 mg under the skin once weekly (increased from 0.25 mg)  Medications are dosed appropriately per patient's most recent renal and hepatic function.   We will follow-up in 4 weeks to discuss tolerability to the 0.5 mg once weekly dose and to discuss titrating up to the 1 mg once weekly dose if tolerable. Patient was encouraged to reach out with any questions and/or concerns prior to our next follow-up.

## 2024-05-14 NOTE — PROGRESS NOTES
Patient is sent at the request of Laura Saldaña M* for my opinion regarding weight management.  My final recommendations will be communicated back to the requesting provider by way of shared medical record.    Subjective     Tammy Rena Lepley is a 53 y.o. female with class 2 obesity as evidenced by her most recent in-office weight of 86.2 kg and calculated BMI of 38.38 kg/m2 on 04/16/24 which indicates her for medication assisted weight loss. Weight related comorbidities include hypertension and hyperlipidemia.     At last pharmacy encounter patient was started on Wegovy 0.25 mg once weekly after prior authorization had been approved. We are meeting today to discuss tolerability to the 0.25 mg once weekly dose and to discuss titrating up to the 0.5 mg once weekly dose if tolerable.     Past Medical History:  She has no past medical history on file.    Past Surgical History:  She has no past surgical history on file.    Social History:  She reports that she has never smoked. She has never been exposed to tobacco smoke. She has never used smokeless tobacco. She reports that she does not currently use alcohol. She reports that she does not use drugs.    Family History:  No family history on file.    Allergies:  Patient has no known allergies.    Previous Weight Loss Attempts:   Has tried Mary Jaswinder - states she previously has done well with this but was unable to keep up with it due to cost   Weight Watchers off and on, states this helps but wasn't able to keep up with it consistently long-term  States that obesity does run in her family (mother, sister, grandmothers)  Has tried intermittent fasting in the past   Has tried general diets and exercising more   Has seen a weight loss doctor in the past, took Metformin but states she didn't like the way it made her feel (noted GI upset and brain fog)    Current diet:   Will eat 2-3 meals per day, will either skip breakfast or lunch depending on how busy her  workday is   Breakfast: banana and breakfast bar with yogurt/fruit if she is going to eat, will have waffles with fruit and fat free syrup; will typically only have a full meal for breakfast if they go out   Lunch: varies - does pack her lunch; has been trying to take a salad or turkey/chicken sandwich, will also have the lean cuisine or healthy frozen microwave meals   Dinner: if she cooks she cooks for her whole family which makes it challenging to stick to a diet, will typically have a protein, vegetable, sometimes more pasta and carbs than other times   Snacks: occasionally will snack after dinner - will have a snack bar or a cake, sometimes chips   Fluids: coffee and water, once in a while will have a soda  Generally eats smaller portion sizes  Eats out about once per week   Is generally trying to eat healthier     Current exercise:   Walks a lot at work   Has not gotten into any other activity  Does have a fitness set with weights and an elliptical   Has been having some trouble with her knee after falling, wants to get it checked before using the elliptical machine more  Has been walking more due to warmer weather and has been gardening and working around the house as well     The patient does not have a known family history of diabetes. Was diagnosed with gestational diabetes with her second pregnancy.     Adverse Effects: notes a stomach ache when she first takes the medication but states this does improve throughout the week after her dose, does not report any other side effects      Objective     Medication Reconciliation:   No changes were made to patient's medication list during encounter today    Current Outpatient Medications on File Prior to Visit   Medication Sig Dispense Refill    calcium carbonate-vitamin D3 (Caltrate 600 plus D) 600 mg-20 mcg (800 unit) tablet,chewable Chew 1 tablet once daily.      hydroCHLOROthiazide (HYDRODiuril) 25 mg tablet Take 1 tablet (25 mg) by mouth once daily. 90  "tablet 3    ibuprofen (Motrin) capsule Take 1 capsule (200 mg) by mouth.      levothyroxine (Synthroid, Levoxyl) 50 mcg tablet TAKE 1 TABLET BY MOUTH ONCE DAILY. 90 tablet 1    Osphena 60 mg tablet Take 1 tablet by mouth once daily.      [DISCONTINUED] semaglutide, weight loss, (Wegovy) 0.25 mg/0.5 mL pen injector Inject 0.25 mg under the skin 1 (one) time per week. 2 mL 0     No current facility-administered medications on file prior to visit.      Last Recorded Vitals:  BP Readings from Last 6 Encounters:   03/21/24 130/82   10/04/23 115/81   05/15/23 133/87   04/10/23 138/86     Wt Readings from Last 6 Encounters:   04/16/24 86.2 kg (190 lb)   03/21/24 85.9 kg (189 lb 6.4 oz)   10/04/23 87.2 kg (192 lb 3.2 oz)   05/15/23 89.4 kg (197 lb)   04/10/23 90.9 kg (200 lb 6.4 oz)     Estimated body mass index is 38.38 kg/m² as calculated from the following:    Height as of 3/21/24: 1.499 m (4' 11\").    Weight as of 4/16/24: 86.2 kg (190 lb).    Patient Reported Home Weights:   05/14/2023: 184 lbs      Weight Loss Pharmacotherapy:  Wegovy 0.25 mg once weekly     Historical Weight Loss Pharmacotherapy:   Metformin (didn't tolerate it well and did not feel that it did a lot to help her lose weight)    Primary/Secondary Prevention   Statin? No    Pertinent PMH Review:  PMH of Pancreatitis: No  PMH of Retinopathy: No  PMH of MTC/MEN 2: No    Lab Review  Lab Results   Component Value Date    BILITOT 0.4 03/21/2024    CALCIUM 9.6 03/21/2024    CO2 30 03/21/2024     03/21/2024    CREATININE 0.76 03/21/2024    GLUCOSE 92 03/21/2024    ALKPHOS 59 03/21/2024    K 4.3 03/21/2024    PROT 6.9 03/21/2024     03/21/2024    AST 9 03/21/2024    ALT 7 03/21/2024    BUN 12 03/21/2024    ANIONGAP 11 03/21/2024    ALBUMIN 4.0 03/21/2024    GFRF 77 04/22/2023     Lab Results   Component Value Date    TRIG 272 (H) 03/21/2024    CHOL 287 (H) 03/21/2024    LDLCALC 169 (H) 03/21/2024    HDL 63.5 03/21/2024     Lab Results   Component " "Value Date    HGBA1C 5.8 (H) 03/21/2024    HGBA1C 5.8 (A) 04/22/2023    HGBA1C 6.0 (H) 03/12/2022     No components found for: \"UACR\"  The 10-year ASCVD risk score (Marisabel MCRAE, et al., 2019) is: 2%    Values used to calculate the score:      Age: 53 years      Sex: Female      Is Non- : No      Diabetic: No      Tobacco smoker: No      Systolic Blood Pressure: 130 mmHg      Is BP treated: No      HDL Cholesterol: 63.5 mg/dL      Total Cholesterol: 287 mg/dL    Health Maintenance:   Lipid Panel:  mg/dL,  mg/dL 03/21/24  Influenza Immunization: typically does not receive, but had the flu this year so she may try again next season      Drug Interactions:  No significant drug-drug interactions exist requiring adjustment to medication therapy.       Assessment/Plan   Problem List Items Addressed This Visit       Class 2 severe obesity due to excess calories with serious comorbidity and body mass index (BMI) of 35.0 to 35.9 in adult (Multi)     Tammy Rena Lepley has class 2 obesity as evidenced by her most-recent in-office weight of 86.2 kg and calculated BMI of 38.38 kg/m2 on 04/16/24 which indicates patient for medication assisted weight loss with weight related comorbidities of hypertension and hyperlipidemia. She was started on Wegovy 0.25 mg once weekly at last pharmacy encounter and has overall tolerated it well, only reporting a stomach ache after her dose that improved throughout the week after. She is agreeable to increasing up to the 0.5 mg once weekly dose.   CHANGE: Wegovy 0.5 mg under the skin once weekly (increased from 0.25 mg)  Medications are dosed appropriately per patient's most recent renal and hepatic function.   We will follow-up in 4 weeks to discuss tolerability to the 0.5 mg once weekly dose and to discuss titrating up to the 1 mg once weekly dose if tolerable. Patient was encouraged to reach out with any questions and/or concerns prior to our next follow-up.    "        Other Visit Diagnoses       Class 2 severe obesity due to excess calories with serious comorbidity and body mass index (BMI) of 38.0 to 38.9 in adult (Multi)    -  Primary    Relevant Medications    semaglutide, weight loss, (Wegovy) 0.5 mg/0.5 mL pen injector (Start on 5/19/2024)    Other Relevant Orders    Follow Up In [facility] - Pharmacy          Pharmacy Follow-Up: 06/11/2024  PCP Follow-Up: None currently scheduled     Katey BlancoD     Continue all meds under the continuation of care with the referring provider and clinical pharmacy team.

## 2024-05-15 ENCOUNTER — PHARMACY VISIT (OUTPATIENT)
Dept: PHARMACY | Facility: CLINIC | Age: 54
End: 2024-05-15
Payer: COMMERCIAL

## 2024-05-30 ENCOUNTER — APPOINTMENT (OUTPATIENT)
Dept: PRIMARY CARE | Facility: CLINIC | Age: 54
End: 2024-05-30
Payer: COMMERCIAL

## 2024-06-05 DIAGNOSIS — E66.01 CLASS 2 SEVERE OBESITY DUE TO EXCESS CALORIES WITH SERIOUS COMORBIDITY AND BODY MASS INDEX (BMI) OF 38.0 TO 38.9 IN ADULT (MULTI): ICD-10-CM

## 2024-06-05 RX ORDER — SEMAGLUTIDE 0.5 MG/.5ML
0.5 INJECTION, SOLUTION SUBCUTANEOUS
Qty: 2 ML | Refills: 0 | OUTPATIENT
Start: 2024-06-09 | End: 2024-07-01

## 2024-06-11 ENCOUNTER — TELEMEDICINE (OUTPATIENT)
Dept: PHARMACY | Facility: HOSPITAL | Age: 54
End: 2024-06-11
Payer: COMMERCIAL

## 2024-06-11 DIAGNOSIS — E66.01 CLASS 2 SEVERE OBESITY DUE TO EXCESS CALORIES WITH SERIOUS COMORBIDITY AND BODY MASS INDEX (BMI) OF 38.0 TO 38.9 IN ADULT (MULTI): Primary | ICD-10-CM

## 2024-06-11 DIAGNOSIS — E66.01 CLASS 2 SEVERE OBESITY DUE TO EXCESS CALORIES WITH SERIOUS COMORBIDITY AND BODY MASS INDEX (BMI) OF 35.0 TO 35.9 IN ADULT (MULTI): ICD-10-CM

## 2024-06-11 PROCEDURE — RXMED WILLOW AMBULATORY MEDICATION CHARGE

## 2024-06-11 RX ORDER — SEMAGLUTIDE 1 MG/.5ML
1 INJECTION, SOLUTION SUBCUTANEOUS
Qty: 2 ML | Refills: 0 | Status: SHIPPED | OUTPATIENT
Start: 2024-06-16 | End: 2024-07-12

## 2024-06-11 NOTE — PROGRESS NOTES
Patient is sent at the request of Laura Saldaña M* for my opinion regarding weight management.  My final recommendations will be communicated back to the requesting provider by way of shared medical record.    Subjective     Tammy Rena Lepley is a 53 y.o. female with class 2 obesity as evidenced by her most recent in-office weight of 86.2 kg and calculated BMI of 38.38 kg/m2 on 04/16/24 which indicates her for medication assisted weight loss. Weight related comorbidities include hypertension and hyperlipidemia.     At last pharmacy encounter patient's Wegovy was increased to 0.5 mg once weekly. We are meeting today to discuss tolerability to the 0.5 mg once weekly dose and to discuss titrating up to the 1 mg once weekly dose if tolerable.     Past Medical History:  She has no past medical history on file.    Past Surgical History:  She has no past surgical history on file.    Social History:  She reports that she has never smoked. She has never been exposed to tobacco smoke. She has never used smokeless tobacco. She reports that she does not currently use alcohol. She reports that she does not use drugs.    Family History:  No family history on file.    Allergies:  Patient has no known allergies.    Previous Weight Loss Attempts:   Has tried Mary Jaswinder - states she previously has done well with this but was unable to keep up with it due to cost   Weight Watchers off and on, states this helps but wasn't able to keep up with it consistently long-term  States that obesity does run in her family (mother, sister, grandmothers)  Has tried intermittent fasting in the past   Has tried general diets and exercising more   Has seen a weight loss doctor in the past, took Metformin but states she didn't like the way it made her feel (noted GI upset and brain fog)    Current diet:   Will eat 2-3 meals per day, will either skip breakfast or lunch depending on how busy her workday is   Breakfast: banana and breakfast bar  with yogurt/fruit if she is going to eat, will have waffles with fruit and fat free syrup; will typically only have a full meal for breakfast if they go out   Lunch: varies - does pack her lunch; has been trying to take a salad or turkey/chicken sandwich, will also have the lean cuisine or healthy frozen microwave meals   Dinner: if she cooks she cooks for her whole family which makes it challenging to stick to a diet, will typically have a protein, vegetable, sometimes more pasta and carbs than other times   Snacks: occasionally will snack after dinner - will have a snack bar or a cake, sometimes chips   Fluids: coffee and water, once in a while will have a soda  Generally eats smaller portion sizes  Eats out about once per week   Is generally trying to eat healthier     Current exercise:   Walks a lot at work   Has not gotten into any other activity  Does have a fitness set with weights and an elliptical   Has been having some trouble with her knee after falling, wants to get it checked before using the elliptical machine more  Has been walking more due to warmer weather and has been gardening and working around the house as well     The patient does not have a known family history of diabetes. Was diagnosed with gestational diabetes with her second pregnancy.     Adverse Effects: notes increased constipation with the 0.5 mg dose of Wegovy - did try a stool softener once and said it helped     Objective     Medication Reconciliation:   No changes were made to patient's medication list during encounter today    Current Outpatient Medications on File Prior to Visit   Medication Sig Dispense Refill    calcium carbonate-vitamin D3 (Caltrate 600 plus D) 600 mg-20 mcg (800 unit) tablet,chewable Chew 1 tablet once daily.      hydroCHLOROthiazide (HYDRODiuril) 25 mg tablet Take 1 tablet (25 mg) by mouth once daily. 90 tablet 3    ibuprofen (Motrin) capsule Take 1 capsule (200 mg) by mouth.      levothyroxine (Synthroid,  "Levoxyl) 50 mcg tablet TAKE 1 TABLET BY MOUTH ONCE DAILY. 90 tablet 1    Osphena 60 mg tablet Take 1 tablet by mouth once daily.      [DISCONTINUED] semaglutide, weight loss, (Wegovy) 0.5 mg/0.5 mL pen injector Inject 0.5 mg under the skin 1 (one) time per week for 4 doses. 2 mL 0     No current facility-administered medications on file prior to visit.      Last Recorded Vitals:  BP Readings from Last 6 Encounters:   03/21/24 130/82   10/04/23 115/81   05/15/23 133/87   04/10/23 138/86     Wt Readings from Last 6 Encounters:   04/16/24 86.2 kg (190 lb)   03/21/24 85.9 kg (189 lb 6.4 oz)   10/04/23 87.2 kg (192 lb 3.2 oz)   05/15/23 89.4 kg (197 lb)   04/10/23 90.9 kg (200 lb 6.4 oz)     Estimated body mass index is 38.38 kg/m² as calculated from the following:    Height as of 3/21/24: 1.499 m (4' 11\").    Weight as of 4/16/24: 86.2 kg (190 lb).    Patient Reported Home Weights:   05/14/24: 184 lbs    06/11/24: 182 lbs     Weight Loss Pharmacotherapy:  Wegovy 0.5 mg once weekly     Historical Weight Loss Pharmacotherapy:   Metformin (didn't tolerate it well and did not feel that it did a lot to help her lose weight)    Primary/Secondary Prevention   Statin? No    Pertinent PMH Review:  PMH of Pancreatitis: No  PMH of Retinopathy: No  PMH of MTC/MEN 2: No    Lab Review  Lab Results   Component Value Date    BILITOT 0.4 03/21/2024    CALCIUM 9.6 03/21/2024    CO2 30 03/21/2024     03/21/2024    CREATININE 0.76 03/21/2024    GLUCOSE 92 03/21/2024    ALKPHOS 59 03/21/2024    K 4.3 03/21/2024    PROT 6.9 03/21/2024     03/21/2024    AST 9 03/21/2024    ALT 7 03/21/2024    BUN 12 03/21/2024    ANIONGAP 11 03/21/2024    ALBUMIN 4.0 03/21/2024    GFRF 77 04/22/2023     Lab Results   Component Value Date    TRIG 272 (H) 03/21/2024    CHOL 287 (H) 03/21/2024    LDLCALC 169 (H) 03/21/2024    HDL 63.5 03/21/2024     Lab Results   Component Value Date    HGBA1C 5.8 (H) 03/21/2024    HGBA1C 5.8 (A) 04/22/2023    HGBA1C " "6.0 (H) 03/12/2022     No components found for: \"UACR\"  The 10-year ASCVD risk score (Marisabel MCRAE, et al., 2019) is: 2%    Values used to calculate the score:      Age: 53 years      Sex: Female      Is Non- : No      Diabetic: No      Tobacco smoker: No      Systolic Blood Pressure: 130 mmHg      Is BP treated: No      HDL Cholesterol: 63.5 mg/dL      Total Cholesterol: 287 mg/dL    Health Maintenance:   Lipid Panel:  mg/dL,  mg/dL 03/21/24  Influenza Immunization: typically does not receive, but had the flu this year so she may try again next season      Drug Interactions:  No significant drug-drug interactions exist requiring adjustment to medication therapy.       Assessment/Plan   Problem List Items Addressed This Visit       Class 2 severe obesity due to excess calories with serious comorbidity and body mass index (BMI) of 35.0 to 35.9 in adult (Multi)     Tammy Rena Lepley has class 2 obesity as evidenced by her most-recent in-office weight of 86.2 kg and calculated BMI of 38.38 kg/m2 on 04/16/24 which indicates patient for medication assisted weight loss with weight related comorbidities of hypertension and hyperlipidemia. At last pharmacy encounter her Wegovy was increased up to 0.5 mg once weekly. She has generally tolerated the dose increase well but does report some mild constipation. We discussed using Miralax over the counter if needed to help with constipation. She is agreeable to increasing up to the 1 mg once weekly dose.   CHANGE: Wegovy 1 mg under the skin once weekly (increased from 0.5 mg)  Medications are dosed appropriately per patient's most recent renal and hepatic function.   We will follow-up in 4 weeks to discuss tolerability to the 1 mg once weekly dose and to discuss titrating her up to the 1.7 mg once weekly dose if tolerable. Patient was encouraged to reach out with any questions and/or concerns prior to our next follow-up.           Other Visit " Diagnoses       Class 2 severe obesity due to excess calories with serious comorbidity and body mass index (BMI) of 38.0 to 38.9 in adult (Multi)    -  Primary    Relevant Medications    semaglutide, weight loss, (Wegovy) 1 mg/0.5 mL pen injector (Start on 6/16/2024)    Other Relevant Orders    Follow Up In [facility] - Pharmacy          Pharmacy Follow-Up: 07/09/2024  PCP Follow-Up: None currently scheduled     Gerry Encarnacion, PharmD     Continue all meds under the continuation of care with the referring provider and clinical pharmacy team.

## 2024-06-11 NOTE — ASSESSMENT & PLAN NOTE
Tammy Rena Lepley has class 2 obesity as evidenced by her most-recent in-office weight of 86.2 kg and calculated BMI of 38.38 kg/m2 on 04/16/24 which indicates patient for medication assisted weight loss with weight related comorbidities of hypertension and hyperlipidemia. At last pharmacy encounter her Wegovy was increased up to 0.5 mg once weekly. She has generally tolerated the dose increase well but does report some mild constipation. We discussed using Miralax over the counter if needed to help with constipation. She is agreeable to increasing up to the 1 mg once weekly dose.   CHANGE: Wegovy 1 mg under the skin once weekly (increased from 0.5 mg)  Medications are dosed appropriately per patient's most recent renal and hepatic function.   We will follow-up in 4 weeks to discuss tolerability to the 1 mg once weekly dose and to discuss titrating her up to the 1.7 mg once weekly dose if tolerable. Patient was encouraged to reach out with any questions and/or concerns prior to our next follow-up.

## 2024-06-15 ENCOUNTER — PHARMACY VISIT (OUTPATIENT)
Dept: PHARMACY | Facility: CLINIC | Age: 54
End: 2024-06-15
Payer: COMMERCIAL

## 2024-07-09 ENCOUNTER — APPOINTMENT (OUTPATIENT)
Dept: PHARMACY | Facility: HOSPITAL | Age: 54
End: 2024-07-09
Payer: COMMERCIAL

## 2024-07-09 DIAGNOSIS — E66.01 CLASS 2 SEVERE OBESITY DUE TO EXCESS CALORIES WITH SERIOUS COMORBIDITY AND BODY MASS INDEX (BMI) OF 38.0 TO 38.9 IN ADULT (MULTI): ICD-10-CM

## 2024-07-09 DIAGNOSIS — E66.01 CLASS 2 SEVERE OBESITY DUE TO EXCESS CALORIES WITH SERIOUS COMORBIDITY AND BODY MASS INDEX (BMI) OF 35.0 TO 35.9 IN ADULT (MULTI): ICD-10-CM

## 2024-07-09 DIAGNOSIS — E66.01 CLASS 2 SEVERE OBESITY DUE TO EXCESS CALORIES WITH SERIOUS COMORBIDITY AND BODY MASS INDEX (BMI) OF 38.0 TO 38.9 IN ADULT (MULTI): Primary | ICD-10-CM

## 2024-07-09 PROCEDURE — RXMED WILLOW AMBULATORY MEDICATION CHARGE

## 2024-07-09 RX ORDER — SEMAGLUTIDE 1.7 MG/.75ML
1.7 INJECTION, SOLUTION SUBCUTANEOUS
Qty: 3 ML | Refills: 0 | Status: SHIPPED | OUTPATIENT
Start: 2024-07-14 | End: 2024-08-07

## 2024-07-09 RX ORDER — SEMAGLUTIDE 1 MG/.5ML
1 INJECTION, SOLUTION SUBCUTANEOUS
Qty: 2 ML | Refills: 0 | OUTPATIENT
Start: 2024-07-14 | End: 2024-08-05

## 2024-07-09 NOTE — ASSESSMENT & PLAN NOTE
Tammy Rena Lepley has class 2 obesity as evidenced by her most-recent in-office weight of 86.2 kg and calculated BMI of 38.38 kg/m2 on 04/16/24 which indicates patient for medication assisted weight loss with weight related comorbidities of hypertension and hyperlipidemia. At last pharmacy encounter her Wegovy was increased up to 1 mg once weekly and she has continued to tolerate it well reporting just occasional mild constipation and nausea. She would like to increase up to the 1.7 mg once weekly dose.   CHANGE: Wegovy 1.7 mg under the skin once weekly (increased from 1 mg once weekly)  Medications are dosed appropriately per most recent renal and hepatic function.   We will follow-up in 4 weeks to discuss tolerability to the 1.7 mg once weekly dose and to discuss titrating her up to the 2.4 mg once weekly dose if tolerable. Patient was encouraged to reach out with any questions and/or concerns prior to our next follow-up.

## 2024-07-09 NOTE — PROGRESS NOTES
Patient is sent at the request of Laura Saldaña M* for my opinion regarding weight management.  My final recommendations will be communicated back to the requesting provider by way of shared medical record.    Subjective     Tammy Rena Lepley is a 53 y.o. female with class 2 obesity as evidenced by her most recent in-office weight of 86.2 kg and calculated BMI of 38.38 kg/m2 on 04/16/24 which indicates her for medication assisted weight loss. Weight related comorbidities include hypertension and hyperlipidemia.     At last pharmacy encounter patient's Wegovy was increased to 1 mg once weekly. We are meeting today to discuss tolerability to the 1 mg once weekly dose and to discuss titrating up to the 1.7 mg once weekly dose if tolerable.     Past Medical History:  She has no past medical history on file.    Past Surgical History:  She has no past surgical history on file.    Social History:  She reports that she has never smoked. She has never been exposed to tobacco smoke. She has never used smokeless tobacco. She reports that she does not currently use alcohol. She reports that she does not use drugs.    Family History:  No family history on file.    Allergies:  Patient has no known allergies.    Previous Weight Loss Attempts:   Has tried Mary Jaswinder - states she previously has done well with this but was unable to keep up with it due to cost   Weight Watchers off and on, states this helps but wasn't able to keep up with it consistently long-term  States that obesity does run in her family (mother, sister, grandmothers)  Has tried intermittent fasting in the past   Has tried general diets and exercising more   Has seen a weight loss doctor in the past, took Metformin but states she didn't like the way it made her feel (noted GI upset and brain fog)    Current diet:   Will eat 2-3 meals per day, will either skip breakfast or lunch depending on how busy her workday is   Breakfast: banana and breakfast bar  with yogurt/fruit if she is going to eat, will have waffles with fruit and fat free syrup; will typically only have a full meal for breakfast if they go out   Lunch: varies - does pack her lunch; has been trying to take a salad or turkey/chicken sandwich, will also have the lean cuisine or healthy frozen microwave meals   Dinner: if she cooks she cooks for her whole family which makes it challenging to stick to a diet, will typically have a protein, vegetable, sometimes more pasta and carbs than other times   Snacks: occasionally will snack after dinner - will have a snack bar or a cake, sometimes chips   Fluids: coffee and water, once in a while will have a soda  Generally eats smaller portion sizes  Eats out about once per week   Is generally trying to eat healthier     Current exercise:   Walks a lot at work   Has not gotten into any other activity  Does have a fitness set with weights and an elliptical   Has been having some trouble with her knee after falling, wants to get it checked before using the elliptical machine more  Has been walking more due to warmer weather and has been gardening and working around the house as well     The patient does not have a known family history of diabetes. Was diagnosed with gestational diabetes with her second pregnancy.     Adverse Effects: notes some occasional constipation and uses Metamucil as needed; has some nausea with the first dose but nothing severe; denies stomach     Objective     Medication Reconciliation:   No changes were made to patient's medication list during encounter today    Current Outpatient Medications on File Prior to Visit   Medication Sig Dispense Refill    calcium carbonate-vitamin D3 (Caltrate 600 plus D) 600 mg-20 mcg (800 unit) tablet,chewable Chew 1 tablet once daily.      hydroCHLOROthiazide (HYDRODiuril) 25 mg tablet Take 1 tablet (25 mg) by mouth once daily. 90 tablet 3    ibuprofen (Motrin) capsule Take 1 capsule (200 mg) by mouth.       "levothyroxine (Synthroid, Levoxyl) 50 mcg tablet TAKE 1 TABLET BY MOUTH ONCE DAILY. 90 tablet 1    Osphena 60 mg tablet Take 1 tablet by mouth once daily.      [DISCONTINUED] semaglutide, weight loss, (Wegovy) 1 mg/0.5 mL pen injector Inject 1 mg under the skin 1 (one) time per week for 4 doses. 2 mL 0     No current facility-administered medications on file prior to visit.      Last Recorded Vitals:  BP Readings from Last 6 Encounters:   03/21/24 130/82   10/04/23 115/81   05/15/23 133/87   04/10/23 138/86     Wt Readings from Last 6 Encounters:   04/16/24 86.2 kg (190 lb)   03/21/24 85.9 kg (189 lb 6.4 oz)   10/04/23 87.2 kg (192 lb 3.2 oz)   05/15/23 89.4 kg (197 lb)   04/10/23 90.9 kg (200 lb 6.4 oz)     Estimated body mass index is 38.38 kg/m² as calculated from the following:    Height as of 3/21/24: 1.499 m (4' 11\").    Weight as of 4/16/24: 86.2 kg (190 lb).    Patient Reported Home Weights:   05/14/24: 184 lbs    06/11/24: 182 lbs   07/09/24: 180 lbs (went on vacation which may have limited her weight loss)    Weight Loss Pharmacotherapy:  Wegovy 1 mg once weekly     Historical Weight Loss Pharmacotherapy:   Metformin (didn't tolerate it well and did not feel that it did a lot to help her lose weight)    Primary/Secondary Prevention   Statin? No    Pertinent PMH Review:  PMH of Pancreatitis: No  PMH of Retinopathy: No  PMH of MTC/MEN 2: No    Lab Review  Lab Results   Component Value Date    BILITOT 0.4 03/21/2024    CALCIUM 9.6 03/21/2024    CO2 30 03/21/2024     03/21/2024    CREATININE 0.76 03/21/2024    GLUCOSE 92 03/21/2024    ALKPHOS 59 03/21/2024    K 4.3 03/21/2024    PROT 6.9 03/21/2024     03/21/2024    AST 9 03/21/2024    ALT 7 03/21/2024    BUN 12 03/21/2024    ANIONGAP 11 03/21/2024    ALBUMIN 4.0 03/21/2024    GFRF 77 04/22/2023     Lab Results   Component Value Date    TRIG 272 (H) 03/21/2024    CHOL 287 (H) 03/21/2024    LDLCALC 169 (H) 03/21/2024    HDL 63.5 03/21/2024     Lab " "Results   Component Value Date    HGBA1C 5.8 (H) 03/21/2024    HGBA1C 5.8 (A) 04/22/2023    HGBA1C 6.0 (H) 03/12/2022     No components found for: \"UACR\"  The 10-year ASCVD risk score (Marisabel MCRAE, et al., 2019) is: 2%    Values used to calculate the score:      Age: 53 years      Sex: Female      Is Non- : No      Diabetic: No      Tobacco smoker: No      Systolic Blood Pressure: 130 mmHg      Is BP treated: No      HDL Cholesterol: 63.5 mg/dL      Total Cholesterol: 287 mg/dL    Health Maintenance:   Lipid Panel:  mg/dL,  mg/dL 03/21/24  Influenza Immunization: typically does not receive, but had the flu this year so she may try again next season      Drug Interactions:  No significant drug-drug interactions exist requiring adjustment to medication therapy.       Assessment/Plan   Problem List Items Addressed This Visit       Class 2 severe obesity due to excess calories with serious comorbidity and body mass index (BMI) of 35.0 to 35.9 in adult (Multi)     Tammy Rena Lepley has class 2 obesity as evidenced by her most-recent in-office weight of 86.2 kg and calculated BMI of 38.38 kg/m2 on 04/16/24 which indicates patient for medication assisted weight loss with weight related comorbidities of hypertension and hyperlipidemia. At last pharmacy encounter her Wegovy was increased up to 1 mg once weekly and she has continued to tolerate it well reporting just occasional mild constipation and nausea. She would like to increase up to the 1.7 mg once weekly dose.   CHANGE: Wegovy 1.7 mg under the skin once weekly (increased from 1 mg once weekly)  Medications are dosed appropriately per most recent renal and hepatic function.   We will follow-up in 4 weeks to discuss tolerability to the 1.7 mg once weekly dose and to discuss titrating her up to the 2.4 mg once weekly dose if tolerable. Patient was encouraged to reach out with any questions and/or concerns prior to our next follow-up.    "        Other Visit Diagnoses       Class 2 severe obesity due to excess calories with serious comorbidity and body mass index (BMI) of 38.0 to 38.9 in adult (Multi)    -  Primary    Relevant Medications    semaglutide, weight loss, (Wegovy) 1.7 mg/0.75 mL pen injector (Start on 7/14/2024)    Other Relevant Orders    Follow Up In [facility] - Pharmacy          Pharmacy Follow-Up: 08/06/2024  PCP Follow-Up: None currently scheduled     Katey BlancoD     Continue all meds under the continuation of care with the referring provider and clinical pharmacy team.

## 2024-07-10 ENCOUNTER — PHARMACY VISIT (OUTPATIENT)
Dept: PHARMACY | Facility: CLINIC | Age: 54
End: 2024-07-10
Payer: COMMERCIAL

## 2024-07-31 DIAGNOSIS — E66.01 CLASS 2 SEVERE OBESITY DUE TO EXCESS CALORIES WITH SERIOUS COMORBIDITY AND BODY MASS INDEX (BMI) OF 38.0 TO 38.9 IN ADULT (MULTI): ICD-10-CM

## 2024-07-31 RX ORDER — SEMAGLUTIDE 1.7 MG/.75ML
1.7 INJECTION, SOLUTION SUBCUTANEOUS
Qty: 3 ML | Refills: 0 | OUTPATIENT
Start: 2024-08-04 | End: 2024-08-26

## 2024-08-06 ENCOUNTER — APPOINTMENT (OUTPATIENT)
Dept: PHARMACY | Facility: HOSPITAL | Age: 54
End: 2024-08-06
Payer: COMMERCIAL

## 2024-08-06 DIAGNOSIS — E66.01 CLASS 2 SEVERE OBESITY DUE TO EXCESS CALORIES WITH SERIOUS COMORBIDITY AND BODY MASS INDEX (BMI) OF 38.0 TO 38.9 IN ADULT (MULTI): ICD-10-CM

## 2024-08-06 DIAGNOSIS — E66.01 CLASS 2 SEVERE OBESITY DUE TO EXCESS CALORIES WITH SERIOUS COMORBIDITY AND BODY MASS INDEX (BMI) OF 35.0 TO 35.9 IN ADULT (MULTI): Primary | ICD-10-CM

## 2024-08-06 RX ORDER — SEMAGLUTIDE 1.7 MG/.75ML
1.7 INJECTION, SOLUTION SUBCUTANEOUS
Qty: 3 ML | Refills: 1 | Status: SHIPPED | OUTPATIENT
Start: 2024-08-11 | End: 2024-09-05

## 2024-08-06 NOTE — PROGRESS NOTES
Patient is sent at the request of Laura Saldaña M* for my opinion regarding weight management.  My final recommendations will be communicated back to the requesting provider by way of shared medical record.    Subjective     Tammy Rena Lepley is a 53 y.o. female with class 2 obesity as evidenced by her most recent in-office weight of 86.2 kg and calculated BMI of 38.38 kg/m2 on 04/16/24 which indicates her for medication assisted weight loss. Weight related comorbidities include hypertension and hyperlipidemia.     At last pharmacy encounter patient's Wegovy was increased from 1 mg to 1.7  mg once weekly. We are meeting today to discuss tolerability to the 1.7 mg once weekly dose and potential titration.     Past Medical History:  She has no past medical history on file.    Past Surgical History:  She has no past surgical history on file.    Social History:  She reports that she has never smoked. She has never been exposed to tobacco smoke. She has never used smokeless tobacco. She reports that she does not currently use alcohol. She reports that she does not use drugs.    Family History:  No family history on file.    Allergies:  Patient has no known allergies.    Current diet:   Will eat 2-3 meals per day, will either skip breakfast or lunch depending on how busy her workday is. She adds that she was also on vacation for a week while on Wegovy 1.7 mg so it was harder to find. Wegovy 1.7 mg increase notes that she is eating minimally. She notes that she can work through lunch and dinner without noticing. She tries to keep hands on healthy snacks around.   Breakfast: Currently finds herself eating a protein breakfast bar or the breakfast drinks  Lunch: varies - does pack her lunch; has been trying to take a salad or turkey/chicken sandwich, will also have the lean cuisine or healthy frozen microwave meals   Dinner: if she cooks she cooks for her whole family which makes it challenging to stick to a diet,  will typically have a protein, vegetable, sometimes more pasta and carbs than other times   Snacks: occasionally will snack after dinner - will have a snack bar or a cake, sometimes chips   Fluids: coffee and water, once in a while will have a soda  Generally eats smaller portion sizes  Eats out about once per week   Is generally trying to eat healthier     Current exercise:   Walks a lot at work   Has not gotten into any other activity  Does have a fitness set with weights and an elliptical   Has been having some trouble with her knee after falling, wants to get it checked before using the elliptical machine more  Has been walking more due to warmer weather and has been gardening and working around the house as well     Previous Weight Loss Attempts:   Has tried Mary Jaswinder - states she previously has done well with this but was unable to keep up with it due to cost   Weight Watchers off and on, states this helps but wasn't able to keep up with it consistently long-term  States that obesity does run in her family (mother, sister, grandmothers)  Has tried intermittent fasting in the past   Has tried general diets and exercising more   Has seen a weight loss doctor in the past, took Metformin but states she didn't like the way it made her feel (noted GI upset and brain fog)    The patient does not have a known family history of diabetes. Was diagnosed with gestational diabetes with her second pregnancy.     Adverse Effects:   Notes some occasional constipation and uses Metamucil as needed  Patient noted with the first 1-2 weeks of Wegovy therapy she experienced some gastrointestinal distress. She did find relief with using gas-x. She has not experienced any vomiting.     Objective     Medication Reconciliation:   No changes were made to patient's medication list during encounter today    Current Outpatient Medications on File Prior to Visit   Medication Sig Dispense Refill    calcium carbonate-vitamin D3 (Caltrate  "600 plus D) 600 mg-20 mcg (800 unit) tablet,chewable Chew 1 tablet once daily.      hydroCHLOROthiazide (HYDRODiuril) 25 mg tablet Take 1 tablet (25 mg) by mouth once daily. 90 tablet 3    ibuprofen (Motrin) capsule Take 1 capsule (200 mg) by mouth.      levothyroxine (Synthroid, Levoxyl) 50 mcg tablet TAKE 1 TABLET BY MOUTH ONCE DAILY. 90 tablet 1    Osphena 60 mg tablet Take 1 tablet by mouth once daily.      [DISCONTINUED] semaglutide, weight loss, (Wegovy) 1.7 mg/0.75 mL pen injector Inject 1.7 mg under the skin 1 (one) time per week for 4 doses. 3 mL 0     No current facility-administered medications on file prior to visit.      Last Recorded Vitals:  BP Readings from Last 6 Encounters:   03/21/24 130/82   10/04/23 115/81   05/15/23 133/87   04/10/23 138/86     Wt Readings from Last 6 Encounters:   04/16/24 86.2 kg (190 lb)   03/21/24 85.9 kg (189 lb 6.4 oz)   10/04/23 87.2 kg (192 lb 3.2 oz)   05/15/23 89.4 kg (197 lb)   04/10/23 90.9 kg (200 lb 6.4 oz)     Estimated body mass index is 38.38 kg/m² as calculated from the following:    Height as of 3/21/24: 1.499 m (4' 11\").    Weight as of 4/16/24: 86.2 kg (190 lb).    Patient Reported Home Weights:   05/14/24: 184 lbs    06/11/24: 182 lbs   07/09/24: 180 lbs (went on vacation which may have limited her weight loss)  08/6/24: 175.6 lbs    Weight Loss Pharmacotherapy:  Wegovy 1.7 mg: inject 1.7 mg under the skin once weekly on Wednesdays (Doses completed: 3; 1 dose remaining)    Historical Weight Loss Pharmacotherapy:   Metformin (didn't tolerate it well and did not feel that it did a lot to help her lose weight)    Primary/Secondary Prevention   Statin? No    Pertinent PMH Review:  PMH of Pancreatitis: No  PMH of Retinopathy: No  PMH of MTC/MEN 2: No    Lab Review  Lab Results   Component Value Date    BILITOT 0.4 03/21/2024    CALCIUM 9.6 03/21/2024    CO2 30 03/21/2024     03/21/2024    CREATININE 0.76 03/21/2024    GLUCOSE 92 03/21/2024    ALKPHOS 59 " "03/21/2024    K 4.3 03/21/2024    PROT 6.9 03/21/2024     03/21/2024    AST 9 03/21/2024    ALT 7 03/21/2024    BUN 12 03/21/2024    ANIONGAP 11 03/21/2024    ALBUMIN 4.0 03/21/2024    GFRF 77 04/22/2023     Lab Results   Component Value Date    TRIG 272 (H) 03/21/2024    CHOL 287 (H) 03/21/2024    LDLCALC 169 (H) 03/21/2024    HDL 63.5 03/21/2024     Lab Results   Component Value Date    HGBA1C 5.8 (H) 03/21/2024    HGBA1C 5.8 (A) 04/22/2023    HGBA1C 6.0 (H) 03/12/2022     No components found for: \"UACR\"  The 10-year ASCVD risk score (Marisabel MCRAE, et al., 2019) is: 2%    Values used to calculate the score:      Age: 53 years      Sex: Female      Is Non- : No      Diabetic: No      Tobacco smoker: No      Systolic Blood Pressure: 130 mmHg      Is BP treated: No      HDL Cholesterol: 63.5 mg/dL      Total Cholesterol: 287 mg/dL    Health Maintenance:   Lipid Panel:  mg/dL,  mg/dL 03/21/24  Influenza Immunization: typically does not receive, but had the flu this year so she may try again next season      Drug Interactions:  No significant drug-drug interactions exist requiring adjustment to medication therapy.     Assessment/Plan   Problem List Items Addressed This Visit       Class 2 severe obesity due to excess calories with serious comorbidity and body mass index (BMI) of 35.0 to 35.9 in adult (Multi) - Primary     Torimy Rena Lepley has class 2 obesity as evidenced by her most-recent home weight of 175.6 lb and calculated BMI of 35.5 kg/m2 on 8/6/2024. She was indicated for medication assisted weight loss with weight related comorbidities of hypertension and hyperlipidemia along with baseline BMI of 38.38 kg/m2. Patient has increased therapy as prescribed with Wegovy to 1.7 mg once weekly. Patient endorses to continue therapy with Wegovy 1.7 mg weekly at this time. Follow up in four weeks. Discussed potential to maximum dose of Wegovy 2.4 mg and if not appropriately " tolerated to maintain therapy at 1.7 mg once weekly.  CHANGE: Wegovy 1.7 mg under the skin once weekly (increased from 1 mg once weekly)  Medications are dosed appropriately per most recent renal and hepatic function.           Other Visit Diagnoses       Class 2 severe obesity due to excess calories with serious comorbidity and body mass index (BMI) of 38.0 to 38.9 in adult (Multi)        Relevant Medications    semaglutide, weight loss, (Wegovy) 1.7 mg/0.75 mL pen injector (Start on 8/11/2024)    Other Relevant Orders    Follow Up In [facility] - Pharmacy          Pharmacy Follow-Up: 9/3/2024 at 4:30 pm  PCP Follow-Up: None currently scheduled     Huang Calvillo, PharmD     Continue all meds under the continuation of care with the referring provider and clinical pharmacy team.

## 2024-08-07 PROCEDURE — RXMED WILLOW AMBULATORY MEDICATION CHARGE

## 2024-08-07 NOTE — ASSESSMENT & PLAN NOTE
Tammy Rena Lepley has class 2 obesity as evidenced by her most-recent home weight of 175.6 lb and calculated BMI of 35.5 kg/m2 on 8/6/2024. She was indicated for medication assisted weight loss with weight related comorbidities of hypertension and hyperlipidemia along with baseline BMI of 38.38 kg/m2. Patient has increased therapy as prescribed with Wegovy to 1.7 mg once weekly. Patient endorses to continue therapy with Wegovy 1.7 mg weekly at this time. Follow up in four weeks. Discussed potential to maximum dose of Wegovy 2.4 mg and if not appropriately tolerated to maintain therapy at 1.7 mg once weekly.  CHANGE: Wegovy 1.7 mg under the skin once weekly (increased from 1 mg once weekly)  Medications are dosed appropriately per most recent renal and hepatic function.

## 2024-08-09 ENCOUNTER — PHARMACY VISIT (OUTPATIENT)
Dept: PHARMACY | Facility: CLINIC | Age: 54
End: 2024-08-09
Payer: COMMERCIAL

## 2024-09-03 ENCOUNTER — APPOINTMENT (OUTPATIENT)
Dept: PHARMACY | Facility: HOSPITAL | Age: 54
End: 2024-09-03
Payer: COMMERCIAL

## 2024-09-03 DIAGNOSIS — E66.01 CLASS 2 SEVERE OBESITY DUE TO EXCESS CALORIES WITH SERIOUS COMORBIDITY AND BODY MASS INDEX (BMI) OF 38.0 TO 38.9 IN ADULT (MULTI): Primary | ICD-10-CM

## 2024-09-03 DIAGNOSIS — E66.01 CLASS 2 SEVERE OBESITY DUE TO EXCESS CALORIES WITH SERIOUS COMORBIDITY AND BODY MASS INDEX (BMI) OF 35.0 TO 35.9 IN ADULT (MULTI): ICD-10-CM

## 2024-09-03 PROCEDURE — RXMED WILLOW AMBULATORY MEDICATION CHARGE

## 2024-09-03 RX ORDER — SEMAGLUTIDE 1.7 MG/.75ML
1.7 INJECTION, SOLUTION SUBCUTANEOUS
Qty: 3 ML | Refills: 1 | Status: SHIPPED | OUTPATIENT
Start: 2024-09-08

## 2024-09-03 NOTE — PROGRESS NOTES
Patient is sent at the request of Laura Saldaña M* for my opinion regarding weight management.  My final recommendations will be communicated back to the requesting provider by way of shared medical record.    Subjective     Tammy Rena Lepley is a 53 y.o. female with class 2 obesity as evidenced by her most recent in-office weight of 86.2 kg and calculated BMI of 38.38 kg/m2 on 04/16/24 which indicates her for medication assisted weight loss. Weight related comorbidities include hypertension and hyperlipidemia.     At last pharmacy encounter patient's Wegovy was kept at 1.7 mg once weekly due to some occasional constipation and GI distress with the first 1-2 weeks of therapy at 1.7 mg once weekly. We are following-up today to see how she's tolerated continuing on the 1.7 mg once weekly dose.     Past Medical History:  She has no past medical history on file.    Past Surgical History:  She has no past surgical history on file.    Social History:  She reports that she has never smoked. She has never been exposed to tobacco smoke. She has never used smokeless tobacco. She reports that she does not currently use alcohol. She reports that she does not use drugs.    Family History:  No family history on file.    Allergies:  Patient has no known allergies.    Current diet:   Will eat 2-3 meals per day, will either skip breakfast or lunch depending on how busy her workday is. She adds that she was also on vacation for a week while on Wegovy 1.7 mg so it was harder to find. Wegovy 1.7 mg increase notes that she is eating minimally. She notes that she can work through lunch and dinner without noticing. She tries to keep hands on healthy snacks around.   Breakfast: Currently finds herself eating a protein breakfast bar or the breakfast drinks  Lunch: varies - does pack her lunch; has been trying to take a salad or turkey/chicken sandwich, will also have the lean cuisine or healthy frozen microwave meals   Dinner: if  she cooks she cooks for her whole family which makes it challenging to stick to a diet, will typically have a protein, vegetable, sometimes more pasta and carbs than other times   Snacks: occasionally will snack after dinner - will have a snack bar or a cake, sometimes chips   Fluids: coffee and water, once in a while will have a soda  Generally eats smaller portion sizes  Eats out about once per week   Is generally trying to eat healthier     Current exercise:   Walks a lot at work   Has not gotten into any other activity  Does have a fitness set with weights and an elliptical   Has been having some trouble with her knee after falling, wants to get it checked before using the elliptical machine more  Has been walking more due to warmer weather and has been gardening and working around the house as well     Previous Weight Loss Attempts:   Has tried Mary Jaswinder - states she previously has done well with this but was unable to keep up with it due to cost   Weight Watchers off and on, states this helps but wasn't able to keep up with it consistently long-term  States that obesity does run in her family (mother, sister, grandmothers)  Has tried intermittent fasting in the past   Has tried general diets and exercising more   Has seen a weight loss doctor in the past, took Metformin but states she didn't like the way it made her feel (noted GI upset and brain fog)    The patient does not have a known family history of diabetes. Was diagnosed with gestational diabetes with her second pregnancy.     Adverse Effects:   Notes that her side effects have improved but are still not resolved   Notes some nausea around when she gives her dose    Objective     Medication Reconciliation:   No changes were made to patient's medication list during encounter today    Current Outpatient Medications on File Prior to Visit   Medication Sig Dispense Refill    calcium carbonate-vitamin D3 (Caltrate 600 plus D) 600 mg-20 mcg (800 unit)  "tablet,chewable Chew 1 tablet once daily.      hydroCHLOROthiazide (HYDRODiuril) 25 mg tablet Take 1 tablet (25 mg) by mouth once daily. 90 tablet 3    ibuprofen (Motrin) capsule Take 1 capsule (200 mg) by mouth.      levothyroxine (Synthroid, Levoxyl) 50 mcg tablet TAKE 1 TABLET BY MOUTH ONCE DAILY. 90 tablet 1    Osphena 60 mg tablet Take 1 tablet by mouth once daily.      [DISCONTINUED] semaglutide, weight loss, (Wegovy) 1.7 mg/0.75 mL pen injector Inject 1.7 mg under the skin 1 (one) time per week for 4 doses. 3 mL 1     No current facility-administered medications on file prior to visit.      Last Recorded Vitals:  BP Readings from Last 6 Encounters:   03/21/24 130/82   10/04/23 115/81   05/15/23 133/87   04/10/23 138/86     Wt Readings from Last 6 Encounters:   04/16/24 86.2 kg (190 lb)   03/21/24 85.9 kg (189 lb 6.4 oz)   10/04/23 87.2 kg (192 lb 3.2 oz)   05/15/23 89.4 kg (197 lb)   04/10/23 90.9 kg (200 lb 6.4 oz)     Estimated body mass index is 38.38 kg/m² as calculated from the following:    Height as of 3/21/24: 1.499 m (4' 11\").    Weight as of 4/16/24: 86.2 kg (190 lb).    Patient Reported Home Weights:   05/14/24: 184 lbs    06/11/24: 182 lbs   07/09/24: 180 lbs (went on vacation which may have limited her weight loss)  08/06/24: 175.6 lbs  09/03/24: 173.6 lbs      Weight Loss Pharmacotherapy:  Wegovy 1.7 mg once weekly on Wednesdays     Historical Weight Loss Pharmacotherapy:   Metformin (didn't tolerate it well and did not feel that it did a lot to help her lose weight)    Primary/Secondary Prevention   Statin? No    Pertinent PMH Review:  PMH of Pancreatitis: No  PMH of Retinopathy: No  PMH of MTC/MEN 2: No    Lab Review  Lab Results   Component Value Date    BILITOT 0.4 03/21/2024    CALCIUM 9.6 03/21/2024    CO2 30 03/21/2024     03/21/2024    CREATININE 0.76 03/21/2024    GLUCOSE 92 03/21/2024    ALKPHOS 59 03/21/2024    K 4.3 03/21/2024    PROT 6.9 03/21/2024     03/21/2024    AST " "9 03/21/2024    ALT 7 03/21/2024    BUN 12 03/21/2024    ANIONGAP 11 03/21/2024    ALBUMIN 4.0 03/21/2024    GFRF 77 04/22/2023     Lab Results   Component Value Date    TRIG 272 (H) 03/21/2024    CHOL 287 (H) 03/21/2024    LDLCALC 169 (H) 03/21/2024    HDL 63.5 03/21/2024     Lab Results   Component Value Date    HGBA1C 5.8 (H) 03/21/2024    HGBA1C 5.8 (A) 04/22/2023    HGBA1C 6.0 (H) 03/12/2022     No components found for: \"UACR\"  The 10-year ASCVD risk score (Marisabel MCRAE, et al., 2019) is: 2%    Values used to calculate the score:      Age: 53 years      Sex: Female      Is Non- : No      Diabetic: No      Tobacco smoker: No      Systolic Blood Pressure: 130 mmHg      Is BP treated: No      HDL Cholesterol: 63.5 mg/dL      Total Cholesterol: 287 mg/dL    Health Maintenance:   Lipid Panel:  mg/dL,  mg/dL 03/21/24  Influenza Immunization: typically does not receive, but had the flu this year so she may try again next season      Drug Interactions:  No significant drug-drug interactions exist requiring adjustment to medication therapy.     Assessment/Plan   Problem List Items Addressed This Visit       Class 2 severe obesity due to excess calories with serious comorbidity and body mass index (BMI) of 35.0 to 35.9 in adult (Multi)     Current regimen includes Wegovy 1.7 mg once weekly. Patient's current weight reported as 173.6 lbs. Starting weight was 189.4 lbs on 03/21/24. Has lost 15.8 lbs (~8.3% of TBW) since starting therapy plan. Today she reports that while her nausea has improved, it has not resolved. Since she has continued to lose weight at the 1.7 mg once weekly dose she would like to continue at 1.7 mg once weekly for another month to see if it continues to improve.     Medication Changes:  CONTINUE  Wegovy 1.7 mg under the skin once weekly     Future Considerations:  If patient's nausea resolved and she is agreeable, could increase up to 2.4 mg once weekly "     Monitoring and Education:  Counseled patient on relevant MOA, expectations, side effects, duration of therapy, contraindications, administration, and monitoring parameters.  We will continue to assess for new/worsening side effects at future follow-ups  Updated weight will be obtained at next follow-up to assess efficacy of Wegovy therapy     We will plan to follow-up in 4-weeks to see if her nausea has improved. She was encouraged to reach out with any questions and/or concerns prior to our next follow-up.             Other Visit Diagnoses       Class 2 severe obesity due to excess calories with serious comorbidity and body mass index (BMI) of 38.0 to 38.9 in adult (Multi)    -  Primary    Relevant Medications    semaglutide, weight loss, (Wegovy) 1.7 mg/0.75 mL pen injector (Start on 9/8/2024)    Other Relevant Orders    Follow Up In [facility] - Pharmacy          Pharmacy Follow-Up: 10/01/2024   PCP Follow-Up: None currently scheduled, recommended patient reach out to schedule    Gerry Encarnacion, PharmD     Continue all meds under the continuation of care with the referring provider and clinical pharmacy team.

## 2024-09-03 NOTE — ASSESSMENT & PLAN NOTE
Current regimen includes Wegovy 1.7 mg once weekly. Patient's current weight reported as 173.6 lbs. Starting weight was 189.4 lbs on 03/21/24. Has lost 15.8 lbs (~8.3% of TBW) since starting therapy plan. Today she reports that while her nausea has improved, it has not resolved. Since she has continued to lose weight at the 1.7 mg once weekly dose she would like to continue at 1.7 mg once weekly for another month to see if it continues to improve.     Medication Changes:  CONTINUE  Wegovy 1.7 mg under the skin once weekly     Future Considerations:  If patient's nausea resolved and she is agreeable, could increase up to 2.4 mg once weekly     Monitoring and Education:  Counseled patient on relevant MOA, expectations, side effects, duration of therapy, contraindications, administration, and monitoring parameters.  We will continue to assess for new/worsening side effects at future follow-ups  Updated weight will be obtained at next follow-up to assess efficacy of Wegovy therapy     We will plan to follow-up in 4-weeks to see if her nausea has improved. She was encouraged to reach out with any questions and/or concerns prior to our next follow-up.

## 2024-09-06 ENCOUNTER — PHARMACY VISIT (OUTPATIENT)
Dept: PHARMACY | Facility: CLINIC | Age: 54
End: 2024-09-06
Payer: COMMERCIAL

## 2024-09-27 DIAGNOSIS — E03.8 OTHER SPECIFIED HYPOTHYROIDISM: ICD-10-CM

## 2024-09-27 RX ORDER — LEVOTHYROXINE SODIUM 50 UG/1
50 TABLET ORAL DAILY
Qty: 90 TABLET | Refills: 3 | Status: SHIPPED | OUTPATIENT
Start: 2024-09-27

## 2024-09-30 PROCEDURE — RXMED WILLOW AMBULATORY MEDICATION CHARGE

## 2024-10-01 ENCOUNTER — APPOINTMENT (OUTPATIENT)
Dept: PHARMACY | Facility: HOSPITAL | Age: 54
End: 2024-10-01
Payer: COMMERCIAL

## 2024-10-01 DIAGNOSIS — E66.812 CLASS 2 SEVERE OBESITY DUE TO EXCESS CALORIES WITH SERIOUS COMORBIDITY AND BODY MASS INDEX (BMI) OF 38.0 TO 38.9 IN ADULT: Primary | ICD-10-CM

## 2024-10-01 DIAGNOSIS — E66.812 CLASS 2 SEVERE OBESITY DUE TO EXCESS CALORIES WITH SERIOUS COMORBIDITY AND BODY MASS INDEX (BMI) OF 35.0 TO 35.9 IN ADULT: ICD-10-CM

## 2024-10-01 DIAGNOSIS — E66.01 CLASS 2 SEVERE OBESITY DUE TO EXCESS CALORIES WITH SERIOUS COMORBIDITY AND BODY MASS INDEX (BMI) OF 35.0 TO 35.9 IN ADULT: ICD-10-CM

## 2024-10-01 DIAGNOSIS — E66.01 CLASS 2 SEVERE OBESITY DUE TO EXCESS CALORIES WITH SERIOUS COMORBIDITY AND BODY MASS INDEX (BMI) OF 38.0 TO 38.9 IN ADULT: Primary | ICD-10-CM

## 2024-10-01 NOTE — PROGRESS NOTES
Patient is sent at the request of Laura Saldaña M* for my opinion regarding weight management.  My final recommendations will be communicated back to the requesting provider by way of shared medical record.    Subjective     Tammy Rena Lepley is a 54 y.o. female with class 2 obesity as evidenced by her most recent in-office weight of 86.2 kg and calculated BMI of 38.38 kg/m2 on 04/16/24 which indicates her for medication assisted weight loss. Weight related comorbidities include hypertension and hyperlipidemia.     At last pharmacy encounter patient's Wegovy was kept at 1.7 mg once weekly due to some occasional constipation and GI distress with the first 1-2 weeks of therapy at 1.7 mg once weekly. We are following-up today to see how she's tolerated continuing on the 1.7 mg once weekly dose.     Past Medical History:  She has no past medical history on file.    Past Surgical History:  She has no past surgical history on file.    Social History:  She reports that she has never smoked. She has never been exposed to tobacco smoke. She has never used smokeless tobacco. She reports that she does not currently use alcohol. She reports that she does not use drugs.    Family History:  No family history on file.    Allergies:  Patient has no known allergies.    Current diet:   Will eat 2-3 meals per day, will either skip breakfast or lunch depending on how busy her workday is. She adds that she was also on vacation for a week while on Wegovy 1.7 mg so it was harder to find. Wegovy 1.7 mg increase notes that she is eating minimally. She notes that she can work through lunch and dinner without noticing. She tries to keep hands on healthy snacks around.   Breakfast: Currently finds herself eating a protein breakfast bar or the breakfast drinks  Lunch: varies - does pack her lunch; has been trying to take a salad or turkey/chicken sandwich, will also have the lean cuisine or healthy frozen microwave meals   Dinner: if  she cooks she cooks for her whole family which makes it challenging to stick to a diet, will typically have a protein, vegetable, sometimes more pasta and carbs than other times   Snacks: occasionally will snack after dinner - will have a snack bar or a cake, sometimes chips   Fluids: coffee and water, once in a while will have a soda  Generally eats smaller portion sizes  Eats out about once per week   Is generally trying to eat healthier     Current exercise:   Walks a lot at work   Has not gotten into any other activity  Does have a fitness set with weights and an elliptical   Has been having some trouble with her knee after falling, wants to get it checked before using the elliptical machine more  Has been walking more due to warmer weather and has been gardening and working around the house as well     Previous Weight Loss Attempts:   Has tried Mary Jaswinder - states she previously has done well with this but was unable to keep up with it due to cost   Weight Watchers off and on, states this helps but wasn't able to keep up with it consistently long-term  States that obesity does run in her family (mother, sister, grandmothers)  Has tried intermittent fasting in the past   Has tried general diets and exercising more   Has seen a weight loss doctor in the past, took Metformin but states she didn't like the way it made her feel (noted GI upset and brain fog)    The patient does not have a known family history of diabetes. Was diagnosed with gestational diabetes with her second pregnancy.     Adverse Effects:   Notes that her side effects have continued to improve but does not feel comfortable increasing up yet  Does report some nausea when she's taking it     Objective     Medication Reconciliation:   No changes were made to patient's medication list during encounter today    Current Outpatient Medications on File Prior to Visit   Medication Sig Dispense Refill    calcium carbonate-vitamin D3 (Caltrate 600 plus D)  "600 mg-20 mcg (800 unit) tablet,chewable Chew 1 tablet once daily.      hydroCHLOROthiazide (HYDRODiuril) 25 mg tablet Take 1 tablet (25 mg) by mouth once daily. 90 tablet 3    ibuprofen (Motrin) capsule Take 1 capsule (200 mg) by mouth.      levothyroxine (Synthroid, Levoxyl) 50 mcg tablet TAKE 1 TABLET BY MOUTH EVERY DAY 90 tablet 3    Osphena 60 mg tablet Take 1 tablet by mouth once daily.      semaglutide, weight loss, (Wegovy) 1.7 mg/0.75 mL pen injector Inject 1.7 mg under the skin 1 (one) time per week. 3 mL 1    [DISCONTINUED] levothyroxine (Synthroid, Levoxyl) 50 mcg tablet TAKE 1 TABLET BY MOUTH ONCE DAILY. 90 tablet 1     No current facility-administered medications on file prior to visit.      Last Recorded Vitals:  BP Readings from Last 6 Encounters:   03/21/24 130/82   10/04/23 115/81   05/15/23 133/87   04/10/23 138/86     Wt Readings from Last 6 Encounters:   04/16/24 86.2 kg (190 lb)   03/21/24 85.9 kg (189 lb 6.4 oz)   10/04/23 87.2 kg (192 lb 3.2 oz)   05/15/23 89.4 kg (197 lb)   04/10/23 90.9 kg (200 lb 6.4 oz)     Estimated body mass index is 38.38 kg/m² as calculated from the following:    Height as of 3/21/24: 1.499 m (4' 11\").    Weight as of 4/16/24: 86.2 kg (190 lb).    Patient Reported Home Weights:   05/14/24: 184 lbs    06/11/24: 182 lbs   07/09/24: 180 lbs (went on vacation which may have limited her weight loss)  08/06/24: 175.6 lbs  09/03/24: 173.6 lbs    10/01/24: 172.8 lbs     Weight Loss Pharmacotherapy:  Wegovy 1.7 mg once weekly on Wednesdays     Historical Weight Loss Pharmacotherapy:   Metformin (didn't tolerate it well and did not feel that it did a lot to help her lose weight)    Primary/Secondary Prevention   Statin? No    Pertinent PMH Review:  PMH of Pancreatitis: No  PMH of Retinopathy: No  PMH of MTC/MEN 2: No    Lab Review  Lab Results   Component Value Date    BILITOT 0.4 03/21/2024    CALCIUM 9.6 03/21/2024    CO2 30 03/21/2024     03/21/2024    CREATININE 0.76 " "03/21/2024    GLUCOSE 92 03/21/2024    ALKPHOS 59 03/21/2024    K 4.3 03/21/2024    PROT 6.9 03/21/2024     03/21/2024    AST 9 03/21/2024    ALT 7 03/21/2024    BUN 12 03/21/2024    ANIONGAP 11 03/21/2024    ALBUMIN 4.0 03/21/2024    GFRF 77 04/22/2023     Lab Results   Component Value Date    TRIG 272 (H) 03/21/2024    CHOL 287 (H) 03/21/2024    LDLCALC 169 (H) 03/21/2024    HDL 63.5 03/21/2024     Lab Results   Component Value Date    HGBA1C 5.8 (H) 03/21/2024    HGBA1C 5.8 (A) 04/22/2023    HGBA1C 6.0 (H) 03/12/2022     No components found for: \"UACR\"  The 10-year ASCVD risk score (Marisabel MCRAE, et al., 2019) is: 2.2%    Values used to calculate the score:      Age: 54 years      Sex: Female      Is Non- : No      Diabetic: No      Tobacco smoker: No      Systolic Blood Pressure: 130 mmHg      Is BP treated: No      HDL Cholesterol: 63.5 mg/dL      Total Cholesterol: 287 mg/dL    Health Maintenance:   Lipid Panel:  mg/dL,  mg/dL 03/21/24  Influenza Immunization: typically does not receive, but had the flu this year so she may try again next season      Drug Interactions:  No significant drug-drug interactions exist requiring adjustment to medication therapy.     Assessment/Plan   Problem List Items Addressed This Visit       Class 2 severe obesity due to excess calories with serious comorbidity and body mass index (BMI) of 35.0 to 35.9 in adult     Current regimen includes Wegovy 1.7 mg once weekly. Patient's current weight reported as 172.8 lbs. Starting weight was 189.4 lbs on 03/21/24. Has lost 16.6 lbs (~8.8% of TBW) since starting therapy plan. Today she reports that her nausea has continued to improve, however, she would like to wait to increase up to the 2.4 mg dose until it has resolved. She will continue at the 1.7 mg dose for another month.    Medication Changes:  CONTINUE  Wegovy 1.7 mg under the skin once weekly     Future Considerations:  If patient's nausea " resolved and she is agreeable, could increase up to 2.4 mg once weekly     Monitoring and Education:  Counseled patient on relevant MOA, expectations, side effects, duration of therapy, contraindications, administration, and monitoring parameters.  We will continue to assess for new/worsening side effects at future follow-ups  Updated weight will be obtained at next follow-up to assess efficacy of Wegovy therapy     We will plan to follow-up in 4-weeks to see . She was encouraged to reach out with any questions and/or concerns prior to our next follow-up.           Other Visit Diagnoses       Class 2 severe obesity due to excess calories with serious comorbidity and body mass index (BMI) of 38.0 to 38.9 in adult    -  Primary    Relevant Orders    Follow Up In [facility] - Pharmacy          Pharmacy Follow-Up: 10/29/2024   PCP Follow-Up: None currently scheduled, recommended patient reach out to schedule    Gerry Encarnacion, PharmD     Continue all meds under the continuation of care with the referring provider and clinical pharmacy team.

## 2024-10-02 ENCOUNTER — PHARMACY VISIT (OUTPATIENT)
Dept: PHARMACY | Facility: CLINIC | Age: 54
End: 2024-10-02
Payer: COMMERCIAL

## 2024-10-02 NOTE — ASSESSMENT & PLAN NOTE
Current regimen includes Wegovy 1.7 mg once weekly. Patient's current weight reported as 172.8 lbs. Starting weight was 189.4 lbs on 03/21/24. Has lost 16.6 lbs (~8.8% of TBW) since starting therapy plan. Today she reports that her nausea has continued to improve, however, she would like to wait to increase up to the 2.4 mg dose until it has resolved. She will continue at the 1.7 mg dose for another month.    Medication Changes:  CONTINUE  Wegovy 1.7 mg under the skin once weekly     Future Considerations:  If patient's nausea resolved and she is agreeable, could increase up to 2.4 mg once weekly     Monitoring and Education:  Counseled patient on relevant MOA, expectations, side effects, duration of therapy, contraindications, administration, and monitoring parameters.  We will continue to assess for new/worsening side effects at future follow-ups  Updated weight will be obtained at next follow-up to assess efficacy of Wegovy therapy     We will plan to follow-up in 4-weeks to see . She was encouraged to reach out with any questions and/or concerns prior to our next follow-up.

## 2024-10-28 DIAGNOSIS — E66.812 CLASS 2 SEVERE OBESITY DUE TO EXCESS CALORIES WITH SERIOUS COMORBIDITY AND BODY MASS INDEX (BMI) OF 38.0 TO 38.9 IN ADULT: ICD-10-CM

## 2024-10-28 DIAGNOSIS — E66.01 CLASS 2 SEVERE OBESITY DUE TO EXCESS CALORIES WITH SERIOUS COMORBIDITY AND BODY MASS INDEX (BMI) OF 38.0 TO 38.9 IN ADULT: ICD-10-CM

## 2024-10-28 RX ORDER — SEMAGLUTIDE 1.7 MG/.75ML
1.7 INJECTION, SOLUTION SUBCUTANEOUS
Qty: 3 ML | Refills: 1 | OUTPATIENT
Start: 2024-10-28

## 2024-10-29 ENCOUNTER — APPOINTMENT (OUTPATIENT)
Dept: PHARMACY | Facility: HOSPITAL | Age: 54
End: 2024-10-29
Payer: COMMERCIAL

## 2024-10-29 DIAGNOSIS — E66.812 CLASS 2 SEVERE OBESITY DUE TO EXCESS CALORIES WITH SERIOUS COMORBIDITY AND BODY MASS INDEX (BMI) OF 35.0 TO 35.9 IN ADULT: ICD-10-CM

## 2024-10-29 DIAGNOSIS — E66.01 CLASS 2 SEVERE OBESITY DUE TO EXCESS CALORIES WITH SERIOUS COMORBIDITY AND BODY MASS INDEX (BMI) OF 35.0 TO 35.9 IN ADULT: ICD-10-CM

## 2024-10-29 DIAGNOSIS — E66.812 CLASS 2 SEVERE OBESITY DUE TO EXCESS CALORIES WITH SERIOUS COMORBIDITY AND BODY MASS INDEX (BMI) OF 38.0 TO 38.9 IN ADULT: Primary | ICD-10-CM

## 2024-10-29 DIAGNOSIS — E66.01 CLASS 2 SEVERE OBESITY DUE TO EXCESS CALORIES WITH SERIOUS COMORBIDITY AND BODY MASS INDEX (BMI) OF 38.0 TO 38.9 IN ADULT: Primary | ICD-10-CM

## 2024-10-29 PROCEDURE — RXMED WILLOW AMBULATORY MEDICATION CHARGE

## 2024-10-29 RX ORDER — SEMAGLUTIDE 2.4 MG/.75ML
2.4 INJECTION, SOLUTION SUBCUTANEOUS WEEKLY
Qty: 3 ML | Refills: 0 | Status: SHIPPED | OUTPATIENT
Start: 2024-10-29

## 2024-11-02 ENCOUNTER — PHARMACY VISIT (OUTPATIENT)
Dept: PHARMACY | Facility: CLINIC | Age: 54
End: 2024-11-02
Payer: COMMERCIAL

## 2024-11-23 DIAGNOSIS — E66.812 CLASS 2 SEVERE OBESITY DUE TO EXCESS CALORIES WITH SERIOUS COMORBIDITY AND BODY MASS INDEX (BMI) OF 38.0 TO 38.9 IN ADULT: ICD-10-CM

## 2024-11-23 DIAGNOSIS — E66.01 CLASS 2 SEVERE OBESITY DUE TO EXCESS CALORIES WITH SERIOUS COMORBIDITY AND BODY MASS INDEX (BMI) OF 38.0 TO 38.9 IN ADULT: ICD-10-CM

## 2024-11-25 RX ORDER — SEMAGLUTIDE 2.4 MG/.75ML
2.4 INJECTION, SOLUTION SUBCUTANEOUS WEEKLY
Qty: 3 ML | Refills: 0 | OUTPATIENT
Start: 2024-11-25

## 2024-11-26 ENCOUNTER — APPOINTMENT (OUTPATIENT)
Dept: PHARMACY | Facility: HOSPITAL | Age: 54
End: 2024-11-26
Payer: COMMERCIAL

## 2024-11-26 DIAGNOSIS — E66.01 CLASS 2 SEVERE OBESITY DUE TO EXCESS CALORIES WITH SERIOUS COMORBIDITY AND BODY MASS INDEX (BMI) OF 35.0 TO 35.9 IN ADULT: ICD-10-CM

## 2024-11-26 DIAGNOSIS — E66.812 CLASS 2 SEVERE OBESITY DUE TO EXCESS CALORIES WITH SERIOUS COMORBIDITY AND BODY MASS INDEX (BMI) OF 38.0 TO 38.9 IN ADULT: Primary | ICD-10-CM

## 2024-11-26 DIAGNOSIS — E66.812 CLASS 2 SEVERE OBESITY DUE TO EXCESS CALORIES WITH SERIOUS COMORBIDITY AND BODY MASS INDEX (BMI) OF 35.0 TO 35.9 IN ADULT: ICD-10-CM

## 2024-11-26 DIAGNOSIS — E66.01 CLASS 2 SEVERE OBESITY DUE TO EXCESS CALORIES WITH SERIOUS COMORBIDITY AND BODY MASS INDEX (BMI) OF 38.0 TO 38.9 IN ADULT: Primary | ICD-10-CM

## 2024-11-26 PROCEDURE — RXMED WILLOW AMBULATORY MEDICATION CHARGE

## 2024-11-26 RX ORDER — SEMAGLUTIDE 2.4 MG/.75ML
2.4 INJECTION, SOLUTION SUBCUTANEOUS WEEKLY
Qty: 9 ML | Refills: 0 | Status: SHIPPED | OUTPATIENT
Start: 2024-11-26

## 2024-11-26 NOTE — PROGRESS NOTES
Patient is sent at the request of Laura Saldaña M* for my opinion regarding weight management.  My final recommendations will be communicated back to the requesting provider by way of shared medical record.    Subjective     Tammy Rena Lepley is a 54 y.o. female with class 2 obesity as evidenced by her most recent in-office weight of 86.2 kg and calculated BMI of 38.38 kg/m2 on 04/16/24 which indicates her for medication assisted weight loss. Weight related comorbidities include hypertension and hyperlipidemia.     At last pharmacy encounter patient's Wegovy was increased up to 2.4 mg once weekly. We are following-up today to see how she has tolerated the dose increase.     Since last encounter, renewal PA was completed for patient's Wegovy. She has approval through 04/30/2025.     Past Medical History:  She has no past medical history on file.    Past Surgical History:  She has no past surgical history on file.    Social History:  She reports that she has never smoked. She has never been exposed to tobacco smoke. She has never used smokeless tobacco. She reports that she does not currently use alcohol. She reports that she does not use drugs.    Family History:  No family history on file.    Allergies:  Patient has no known allergies.    Current diet:   Will eat 2-3 meals per day, will either skip breakfast or lunch depending on how busy her workday is. She adds that she was also on vacation for a week while on Wegovy 1.7 mg so it was harder to find. Wegovy 1.7 mg increase notes that she is eating minimally. She notes that she can work through lunch and dinner without noticing. She tries to keep hands on healthy snacks around.   Breakfast: Currently finds herself eating a protein breakfast bar or the breakfast drinks  Lunch: varies - does pack her lunch; has been trying to take a salad or turkey/chicken sandwich, will also have the lean cuisine or healthy frozen microwave meals   Dinner: if she cooks she  cooks for her whole family which makes it challenging to stick to a diet, will typically have a protein, vegetable, sometimes more pasta and carbs than other times   Snacks: occasionally will snack after dinner - will have a snack bar or a cake, sometimes chips   Fluids: coffee and water, once in a while will have a soda  Generally eats smaller portion sizes  Eats out about once per week   Is generally trying to eat healthier     Current exercise:   Walks a lot at work   Has not gotten into any other activity  Does have a fitness set with weights and an elliptical   Has been having some trouble with her knee after falling, wants to get it checked before using the elliptical machine more  Has been walking more due to warmer weather and has been gardening and working around the house as well     Previous Weight Loss Attempts:   Has tried Mary Jaswinder - states she previously has done well with this but was unable to keep up with it due to cost   Weight Watchers off and on, states this helps but wasn't able to keep up with it consistently long-term  States that obesity does run in her family (mother, sister, grandmothers)  Has tried intermittent fasting in the past   Has tried general diets and exercising more   Has seen a weight loss doctor in the past, took Metformin but states she didn't like the way it made her feel (noted GI upset and brain fog)    The patient does not have a known family history of diabetes. Was diagnosed with gestational diabetes with her second pregnancy.     Adverse Effects:   Notes some nausea the first couple days after her dose  Has remained stable from when she was on the 1.7 mg dose   No new side effects with the increased dose    Objective     Medication Reconciliation:   No changes were made to patient's medication list during encounter today    Current Outpatient Medications on File Prior to Visit   Medication Sig Dispense Refill    calcium carbonate-vitamin D3 (Caltrate 600 plus D) 600  "mg-20 mcg (800 unit) tablet,chewable Chew 1 tablet once daily.      hydroCHLOROthiazide (HYDRODiuril) 25 mg tablet Take 1 tablet (25 mg) by mouth once daily. 90 tablet 3    ibuprofen (Motrin) capsule Take 1 capsule (200 mg) by mouth.      levothyroxine (Synthroid, Levoxyl) 50 mcg tablet TAKE 1 TABLET BY MOUTH EVERY DAY 90 tablet 3    Osphena 60 mg tablet Take 1 tablet by mouth once daily.      [DISCONTINUED] semaglutide, weight loss, (Wegovy) 2.4 mg/0.75 mL pen injector Inject 2.4 mg under the skin 1 (one) time per week. 3 mL 0     No current facility-administered medications on file prior to visit.      Last Recorded Vitals:  BP Readings from Last 6 Encounters:   03/21/24 130/82   10/04/23 115/81   05/15/23 133/87   04/10/23 138/86     Wt Readings from Last 6 Encounters:   04/16/24 86.2 kg (190 lb)   03/21/24 85.9 kg (189 lb 6.4 oz)   10/04/23 87.2 kg (192 lb 3.2 oz)   05/15/23 89.4 kg (197 lb)   04/10/23 90.9 kg (200 lb 6.4 oz)     Estimated body mass index is 38.38 kg/m² as calculated from the following:    Height as of 3/21/24: 1.499 m (4' 11\").    Weight as of 4/16/24: 86.2 kg (190 lb).    Patient Reported Home Weights:   05/14/24: 184 lbs    06/11/24: 182 lbs   07/09/24: 180 lbs (went on vacation which may have limited her weight loss)  08/06/24: 175.6 lbs  09/03/24: 173.6 lbs    10/01/24: 172.8 lbs   10/29/24: 168 lbs   11/26/24: 167 lbs     Weight Loss Pharmacotherapy:  Wegovy 2.4 mg once weekly on Wednesdays     Historical Weight Loss Pharmacotherapy:   Metformin (didn't tolerate it well and did not feel that it did a lot to help her lose weight)    Primary/Secondary Prevention   Statin? No    Pertinent PMH Review:  PMH of Pancreatitis: No  PMH of Retinopathy: No  PMH of MTC/MEN 2: No    Lab Review  Lab Results   Component Value Date    BILITOT 0.4 03/21/2024    CALCIUM 9.6 03/21/2024    CO2 30 03/21/2024     03/21/2024    CREATININE 0.76 03/21/2024    GLUCOSE 92 03/21/2024    ALKPHOS 59 03/21/2024    " "K 4.3 03/21/2024    PROT 6.9 03/21/2024     03/21/2024    AST 9 03/21/2024    ALT 7 03/21/2024    BUN 12 03/21/2024    ANIONGAP 11 03/21/2024    ALBUMIN 4.0 03/21/2024    GFRF 77 04/22/2023     Lab Results   Component Value Date    TRIG 272 (H) 03/21/2024    CHOL 287 (H) 03/21/2024    LDLCALC 169 (H) 03/21/2024    HDL 63.5 03/21/2024     Lab Results   Component Value Date    HGBA1C 5.8 (H) 03/21/2024    HGBA1C 5.8 (A) 04/22/2023    HGBA1C 6.0 (H) 03/12/2022     No components found for: \"UACR\"  The 10-year ASCVD risk score (Marisabel MCRAE, et al., 2019) is: 2.2%    Values used to calculate the score:      Age: 54 years      Sex: Female      Is Non- : No      Diabetic: No      Tobacco smoker: No      Systolic Blood Pressure: 130 mmHg      Is BP treated: No      HDL Cholesterol: 63.5 mg/dL      Total Cholesterol: 287 mg/dL    Health Maintenance:   Lipid Panel:  mg/dL,  mg/dL 03/21/24  Influenza Immunization: typically does not receive, but had the flu this year so she may try again next season      Drug Interactions:  No significant drug-drug interactions exist requiring adjustment to medication therapy.     Assessment/Plan   Problem List Items Addressed This Visit       Class 2 severe obesity due to excess calories with serious comorbidity and body mass index (BMI) of 35.0 to 35.9 in adult     Current regimen includes Wegovy 2.4 mg once weekly. Patient's current weight reported as 167 lbs. Starting weight was 189.4 lbs on 03/21/24. Has lost 22.4 lbs (~11.3% of TBW) since starting therapy plan. Today she reports that she still has some mild nausea but it has remained stable from when she was on the 1.7 mg dose. Reports no new side effects. Weight loss over the past month has been slower and her weight has remained stable. We will continue at 2.4 mg for another 3 months and then check in to see where her weight is.     Medication Changes:  CONTINUE:   Wegovy 2.4 mg under the skin " once weekly     Future Considerations:  In the future could consider reducing down the dose if patient reaches goal weight or if weight loss from the medication slows down consistently and she would like to reduce the dose.     Monitoring and Education:  Counseled patient on relevant MOA, expectations, side effects, duration of therapy, contraindications, administration, and monitoring parameters.  We will continue to assess for new/worsening side effects at future follow-ups  Updated weight will be obtained at next follow-up to assess efficacy of Wegovy therapy     We will plan to follow-up in ~3 months to see how she has continued to tolerate the Wegovy. She was encouraged to reach out with any questions and/or concerns prior to then.           Other Visit Diagnoses       Class 2 severe obesity due to excess calories with serious comorbidity and body mass index (BMI) of 38.0 to 38.9 in adult    -  Primary    Relevant Medications    semaglutide, weight loss, (Wegovy) 2.4 mg/0.75 mL pen injector    Other Relevant Orders    Referral to Clinical Pharmacy          Pharmacy Follow-Up: 02/18/2024   PCP Follow-Up: None currently scheduled, recommended patient reach out to schedule    Gerry Encarnacion, PharmD     Continue all meds under the continuation of care with the referring provider and clinical pharmacy team.

## 2024-11-26 NOTE — ASSESSMENT & PLAN NOTE
Current regimen includes Wegovy 2.4 mg once weekly. Patient's current weight reported as 167 lbs. Starting weight was 189.4 lbs on 03/21/24. Has lost 22.4 lbs (~11.3% of TBW) since starting therapy plan. Today she reports that she still has some mild nausea but it has remained stable from when she was on the 1.7 mg dose. Reports no new side effects. Weight loss over the past month has been slower and her weight has remained stable. We will continue at 2.4 mg for another 3 months and then check in to see where her weight is.     Medication Changes:  CONTINUE:   Wegovy 2.4 mg under the skin once weekly     Future Considerations:  In the future could consider reducing down the dose if patient reaches goal weight or if weight loss from the medication slows down consistently and she would like to reduce the dose.     Monitoring and Education:  Counseled patient on relevant MOA, expectations, side effects, duration of therapy, contraindications, administration, and monitoring parameters.  We will continue to assess for new/worsening side effects at future follow-ups  Updated weight will be obtained at next follow-up to assess efficacy of Wegovy therapy     We will plan to follow-up in ~3 months to see how she has continued to tolerate the Wegovy. She was encouraged to reach out with any questions and/or concerns prior to then.

## 2024-11-30 ENCOUNTER — PHARMACY VISIT (OUTPATIENT)
Dept: PHARMACY | Facility: CLINIC | Age: 54
End: 2024-11-30
Payer: COMMERCIAL

## 2025-02-15 DIAGNOSIS — E66.01 CLASS 2 SEVERE OBESITY DUE TO EXCESS CALORIES WITH SERIOUS COMORBIDITY AND BODY MASS INDEX (BMI) OF 38.0 TO 38.9 IN ADULT: ICD-10-CM

## 2025-02-15 DIAGNOSIS — E66.812 CLASS 2 SEVERE OBESITY DUE TO EXCESS CALORIES WITH SERIOUS COMORBIDITY AND BODY MASS INDEX (BMI) OF 38.0 TO 38.9 IN ADULT: ICD-10-CM

## 2025-02-17 RX ORDER — SEMAGLUTIDE 2.4 MG/.75ML
2.4 INJECTION, SOLUTION SUBCUTANEOUS WEEKLY
Qty: 9 ML | Refills: 0 | OUTPATIENT
Start: 2025-02-17

## 2025-02-17 NOTE — PROGRESS NOTES
Patient is sent at the request of Laura Saldaña M* for my opinion regarding weight management.  My final recommendations will be communicated back to the requesting provider by way of shared medical record.    Subjective     Tammy Rena Lepley is a 54 y.o. female with class 2 obesity as evidenced by her most starting in-office weight of 86.2 kg and calculated BMI of 38.38 kg/m2 on 04/16/24 which indicated her for medication assisted weight loss. Weight related comorbidities include hypertension and hyperlipidemia.     At last pharmacy encounter patient reported some mild nausea with the increased dose of Wegovy but states it did not worsen from when she was on the 1.7 mg dose. Weight loss had been a little slower too. She was continued on the 2.4 mg dose for the past 3 months. We are following-up today to see how she has done since our last encounter.     Wegovy PA approved through 04/30/2025.     Past Medical History:  She has no past medical history on file.    Past Surgical History:  She has no past surgical history on file.    Social History:  She reports that she has never smoked. She has never been exposed to tobacco smoke. She has never used smokeless tobacco. She reports that she does not currently use alcohol. She reports that she does not use drugs.    Family History:  No family history on file.    Allergies:  Patient has no known allergies.    Current diet:   Will eat 2-3 meals per day, will either skip breakfast or lunch depending on how busy her workday is. She adds that she was also on vacation for a week while on Wegovy 1.7 mg so it was harder to find. Wegovy 1.7 mg increase notes that she is eating minimally. She notes that she can work through lunch and dinner without noticing. She tries to keep hands on healthy snacks around.   Breakfast: Currently finds herself eating a protein breakfast bar or the breakfast drinks  Lunch: varies - does pack her lunch; has been trying to take a salad or  turkey/chicken sandwich, will also have the lean cuisine or healthy frozen microwave meals   Dinner: if she cooks she cooks for her whole family which makes it challenging to stick to a diet, will typically have a protein, vegetable, sometimes more pasta and carbs than other times   Snacks: occasionally will snack after dinner - will have a snack bar or a cake, sometimes chips   Fluids: coffee and water, once in a while will have a soda  Generally eats smaller portion sizes  Eats out about once per week   Is generally trying to eat healthier     Current exercise:   Walks a lot at work   Has not gotten into any other activity  Does have a fitness set with weights and an elliptical   Has been having some trouble with her knee after falling, wants to get it checked before using the elliptical machine more  Has been walking more due to warmer weather and has been gardening and working around the house as well     Previous Weight Loss Attempts:   Has tried [employer] J[employer] - states she previously has done well with this but was unable to keep up with it due to cost   Weight Watchers off and on, states this helps but wasn't able to keep up with it consistently long-term  States that obesity does run in her family (mother, sister, grandmothers)  Has tried intermittent fasting in the past   Has tried general diets and exercising more   Has seen a weight loss doctor in the past, took Metformin but states she didn't like the way it made her feel (noted GI upset and brain fog)    The patient does not have a known family history of diabetes. Was diagnosed with gestational diabetes with her second pregnancy.     Adverse Effects:   Notes some nausea the first couple days after her dose, did have one episode of vomiting     Objective     Medication Reconciliation:   No changes were made to patient's medication list during encounter today    Current Outpatient Medications on File Prior to Visit   Medication Sig Dispense Refill     "calcium carbonate-vitamin D3 (Caltrate 600 plus D) 600 mg-20 mcg (800 unit) tablet,chewable Chew 1 tablet once daily.      hydroCHLOROthiazide (HYDRODiuril) 25 mg tablet Take 1 tablet (25 mg) by mouth once daily. 90 tablet 3    ibuprofen (Motrin) capsule Take 1 capsule (200 mg) by mouth.      levothyroxine (Synthroid, Levoxyl) 50 mcg tablet TAKE 1 TABLET BY MOUTH EVERY DAY 90 tablet 3    Osphena 60 mg tablet Take 1 tablet by mouth once daily.      [DISCONTINUED] semaglutide, weight loss, (Wegovy) 2.4 mg/0.75 mL pen injector Inject 2.4 mg under the skin 1 (one) time per week. 9 mL 0     No current facility-administered medications on file prior to visit.      Last Recorded Vitals:  BP Readings from Last 6 Encounters:   03/21/24 130/82   10/04/23 115/81   05/15/23 133/87   04/10/23 138/86     Wt Readings from Last 6 Encounters:   04/16/24 86.2 kg (190 lb)   03/21/24 85.9 kg (189 lb 6.4 oz)   10/04/23 87.2 kg (192 lb 3.2 oz)   05/15/23 89.4 kg (197 lb)   04/10/23 90.9 kg (200 lb 6.4 oz)     Estimated body mass index is 38.38 kg/m² as calculated from the following:    Height as of 3/21/24: 1.499 m (4' 11\").    Weight as of 4/16/24: 86.2 kg (190 lb).    Patient Reported Home Weights:   05/14/24: 184 lbs    06/11/24: 182 lbs   07/09/24: 180 lbs (went on vacation which may have limited her weight loss)  08/06/24: 175.6 lbs  09/03/24: 173.6 lbs    10/01/24: 172.8 lbs   10/29/24: 168 lbs   11/26/24: 167 lbs   02/18/25: 159 lbs    Weight Loss Pharmacotherapy:  Wegovy 2.4 mg once weekly (Wednesdays)    Historical Weight Loss Pharmacotherapy:   Metformin (didn't tolerate it well and did not feel that it did a lot to help her lose weight)    Primary/Secondary Prevention   Statin? No    Pertinent PMH Review:  PMH of Pancreatitis: No  PMH of Retinopathy: No  PMH of MTC/MEN 2: No    Lab Review  Lab Results   Component Value Date    BILITOT 0.4 03/21/2024    CALCIUM 9.6 03/21/2024    CO2 30 03/21/2024     03/21/2024    " "CREATININE 0.76 03/21/2024    GLUCOSE 92 03/21/2024    ALKPHOS 59 03/21/2024    K 4.3 03/21/2024    PROT 6.9 03/21/2024     03/21/2024    AST 9 03/21/2024    ALT 7 03/21/2024    BUN 12 03/21/2024    ANIONGAP 11 03/21/2024    ALBUMIN 4.0 03/21/2024    GFRF 77 04/22/2023     Lab Results   Component Value Date    TRIG 272 (H) 03/21/2024    CHOL 287 (H) 03/21/2024    LDLCALC 169 (H) 03/21/2024    HDL 63.5 03/21/2024     Lab Results   Component Value Date    HGBA1C 5.8 (H) 03/21/2024    HGBA1C 5.8 (A) 04/22/2023    HGBA1C 6.0 (H) 03/12/2022     No components found for: \"UACR\"  The 10-year ASCVD risk score (Marisabel MCRAE, et al., 2019) is: 2.2%    Values used to calculate the score:      Age: 54 years      Sex: Female      Is Non- : No      Diabetic: No      Tobacco smoker: No      Systolic Blood Pressure: 130 mmHg      Is BP treated: No      HDL Cholesterol: 63.5 mg/dL      Total Cholesterol: 287 mg/dL    Health Maintenance:   Lipid Panel:  mg/dL,  mg/dL 03/21/24  Influenza Immunization: typically does not receive    Drug Interactions:  No significant drug-drug interactions exist requiring adjustment to medication therapy.     Assessment/Plan   Problem List Items Addressed This Visit       Class 2 severe obesity due to excess calories with serious comorbidity and body mass index (BMI) of 35.0 to 35.9 in adult     Current regimen includes Wegovy 2.4 mg once weekly. Patient's current weight reported as 159 lbs. Starting weight was 189.4 lbs on 03/21/24. Has lost 30.4 lbs (~16.1% of TBW) since starting therapy plan. Today she reports continued nausea in the few days after her dose and one episode of vomiting. Will reduce dose back down to 1.7 mg once weekly to see if nausea improves, patient agreeable to this.     Medication Changes:  DECREASE:   Wegovy 1.7 mg under the skin once weekly     Future Considerations:  If nausea does not improve could try reducing down further to 1 mg once " weekly, if she could maintain weight loss at the 1 mg dose it would still be beneficial  If nausea/vomiting does not improve will discuss considering stopping therapy     Monitoring and Education:  Counseled patient on relevant MOA, expectations, side effects, duration of therapy, contraindications, administration, and monitoring parameters.  We will continue to assess for new/worsening side effects at future follow-ups to see if nausea is improving or worsening   Updated weight will be obtained at next follow-up to assess efficacy of Wegovy therapy     We will plan to follow-up in 4 weeks to see how she has tolerated the reduced dose of Wegovy. She was encouraged to reach out with any questions and/or concerns prior to then.           Other Visit Diagnoses       Class 2 severe obesity due to excess calories with serious comorbidity and body mass index (BMI) of 38.0 to 38.9 in adult    -  Primary    Relevant Medications    semaglutide, weight loss, (Wegovy) 1.7 mg/0.75 mL pen injector    Other Relevant Orders    Referral to Clinical Pharmacy          Pharmacy Follow-Up: 03/18/2025   PCP Follow-Up: None currently scheduled, recommended patient reach out to schedule ASAP as she's due for yearly visit     Gerry Encarnacion, PharmD     Continue all meds under the continuation of care with the referring provider and clinical pharmacy team.

## 2025-02-18 ENCOUNTER — APPOINTMENT (OUTPATIENT)
Dept: PHARMACY | Facility: HOSPITAL | Age: 55
End: 2025-02-18
Payer: COMMERCIAL

## 2025-02-18 DIAGNOSIS — E66.01 CLASS 2 SEVERE OBESITY DUE TO EXCESS CALORIES WITH SERIOUS COMORBIDITY AND BODY MASS INDEX (BMI) OF 35.0 TO 35.9 IN ADULT: ICD-10-CM

## 2025-02-18 DIAGNOSIS — E66.812 CLASS 2 SEVERE OBESITY DUE TO EXCESS CALORIES WITH SERIOUS COMORBIDITY AND BODY MASS INDEX (BMI) OF 38.0 TO 38.9 IN ADULT: Primary | ICD-10-CM

## 2025-02-18 DIAGNOSIS — E66.812 CLASS 2 SEVERE OBESITY DUE TO EXCESS CALORIES WITH SERIOUS COMORBIDITY AND BODY MASS INDEX (BMI) OF 35.0 TO 35.9 IN ADULT: ICD-10-CM

## 2025-02-18 DIAGNOSIS — E66.01 CLASS 2 SEVERE OBESITY DUE TO EXCESS CALORIES WITH SERIOUS COMORBIDITY AND BODY MASS INDEX (BMI) OF 38.0 TO 38.9 IN ADULT: Primary | ICD-10-CM

## 2025-02-18 PROCEDURE — RXMED WILLOW AMBULATORY MEDICATION CHARGE

## 2025-02-18 RX ORDER — SEMAGLUTIDE 1.7 MG/.75ML
1.7 INJECTION, SOLUTION SUBCUTANEOUS WEEKLY
Qty: 3 ML | Refills: 0 | Status: SHIPPED | OUTPATIENT
Start: 2025-02-18 | End: 2025-03-19

## 2025-02-18 NOTE — ASSESSMENT & PLAN NOTE
Current regimen includes Wegovy 2.4 mg once weekly. Patient's current weight reported as 159 lbs. Starting weight was 189.4 lbs on 03/21/24. Has lost 30.4 lbs (~16.1% of TBW) since starting therapy plan. Today she reports continued nausea in the few days after her dose and one episode of vomiting. Will reduce dose back down to 1.7 mg once weekly to see if nausea improves, patient agreeable to this.     Medication Changes:  DECREASE:   Wegovy 1.7 mg under the skin once weekly     Future Considerations:  If nausea does not improve could try reducing down further to 1 mg once weekly, if she could maintain weight loss at the 1 mg dose it would still be beneficial  If nausea/vomiting does not improve will discuss considering stopping therapy     Monitoring and Education:  Counseled patient on relevant MOA, expectations, side effects, duration of therapy, contraindications, administration, and monitoring parameters.  We will continue to assess for new/worsening side effects at future follow-ups to see if nausea is improving or worsening   Updated weight will be obtained at next follow-up to assess efficacy of Wegovy therapy     We will plan to follow-up in 4 weeks to see how she has tolerated the reduced dose of Wegovy. She was encouraged to reach out with any questions and/or concerns prior to then.

## 2025-02-19 ENCOUNTER — PHARMACY VISIT (OUTPATIENT)
Dept: PHARMACY | Facility: CLINIC | Age: 55
End: 2025-02-19
Payer: COMMERCIAL

## 2025-02-25 ENCOUNTER — APPOINTMENT (OUTPATIENT)
Dept: PRIMARY CARE | Facility: CLINIC | Age: 55
End: 2025-02-25
Payer: COMMERCIAL

## 2025-02-25 VITALS
HEIGHT: 59 IN | TEMPERATURE: 98 F | RESPIRATION RATE: 16 BRPM | BODY MASS INDEX: 32.74 KG/M2 | DIASTOLIC BLOOD PRESSURE: 80 MMHG | SYSTOLIC BLOOD PRESSURE: 130 MMHG | HEART RATE: 68 BPM | OXYGEN SATURATION: 100 % | WEIGHT: 162.4 LBS

## 2025-02-25 DIAGNOSIS — R73.03 PREDIABETES: ICD-10-CM

## 2025-02-25 DIAGNOSIS — E55.9 VITAMIN D DEFICIENCY: ICD-10-CM

## 2025-02-25 DIAGNOSIS — E78.2 MIXED HYPERLIPIDEMIA: ICD-10-CM

## 2025-02-25 DIAGNOSIS — Z00.00 HEALTHCARE MAINTENANCE: Primary | ICD-10-CM

## 2025-02-25 DIAGNOSIS — E03.9 HYPOTHYROIDISM, UNSPECIFIED TYPE: ICD-10-CM

## 2025-02-25 DIAGNOSIS — I10 PRIMARY HYPERTENSION: ICD-10-CM

## 2025-02-25 PROCEDURE — 3075F SYST BP GE 130 - 139MM HG: CPT | Performed by: INTERNAL MEDICINE

## 2025-02-25 PROCEDURE — 3079F DIAST BP 80-89 MM HG: CPT | Performed by: INTERNAL MEDICINE

## 2025-02-25 PROCEDURE — 3008F BODY MASS INDEX DOCD: CPT | Performed by: INTERNAL MEDICINE

## 2025-02-25 PROCEDURE — 93000 ELECTROCARDIOGRAM COMPLETE: CPT | Performed by: INTERNAL MEDICINE

## 2025-02-25 PROCEDURE — 99396 PREV VISIT EST AGE 40-64: CPT | Performed by: INTERNAL MEDICINE

## 2025-02-25 PROCEDURE — 1036F TOBACCO NON-USER: CPT | Performed by: INTERNAL MEDICINE

## 2025-02-25 PROCEDURE — 99214 OFFICE O/P EST MOD 30 MIN: CPT | Performed by: INTERNAL MEDICINE

## 2025-02-25 RX ORDER — FLUOCINOLONE ACETONIDE 0.25 MG/G
OINTMENT TOPICAL
COMMUNITY
Start: 2025-02-10

## 2025-02-25 ASSESSMENT — ENCOUNTER SYMPTOMS
SPEECH DIFFICULTY: 0
CONSTIPATION: 0
HEADACHES: 0
ARTHRALGIAS: 0
DYSURIA: 0
PSYCHIATRIC NEGATIVE: 1
ADENOPATHY: 0
CARDIOVASCULAR NEGATIVE: 1
SORE THROAT: 0
BRUISES/BLEEDS EASILY: 0
WEAKNESS: 0
BLOOD IN STOOL: 0
GASTROINTESTINAL NEGATIVE: 1
ACTIVITY CHANGE: 0
JOINT SWELLING: 0
ENDOCRINE NEGATIVE: 1
VOMITING: 0
VOICE CHANGE: 0
APPETITE CHANGE: 0
CHEST TIGHTNESS: 0
SINUS PAIN: 0
MUSCULOSKELETAL NEGATIVE: 1
NECK PAIN: 0
WOUND: 0
TROUBLE SWALLOWING: 0
COLOR CHANGE: 0
MYALGIAS: 0
HALLUCINATIONS: 0
LIGHT-HEADEDNESS: 0
FREQUENCY: 0
SHORTNESS OF BREATH: 0
WHEEZING: 0
RESPIRATORY NEGATIVE: 1
SLEEP DISTURBANCE: 0
STRIDOR: 0
NERVOUS/ANXIOUS: 0
DIFFICULTY URINATING: 0
BACK PAIN: 0
EYES NEGATIVE: 1
HEMATOLOGIC/LYMPHATIC NEGATIVE: 1
FACIAL ASYMMETRY: 0
NUMBNESS: 0
FLANK PAIN: 0
AGITATION: 0
ALLERGIC/IMMUNOLOGIC NEGATIVE: 1
NAUSEA: 0
NEUROLOGICAL NEGATIVE: 1
COUGH: 0
CONSTITUTIONAL NEGATIVE: 1
EYE PAIN: 0
EYE REDNESS: 0
ABDOMINAL PAIN: 0
DIARRHEA: 0
TREMORS: 0
POLYDIPSIA: 0
PALPITATIONS: 0
SINUS PRESSURE: 0
UNEXPECTED WEIGHT CHANGE: 0
NECK STIFFNESS: 0
EYE DISCHARGE: 0
CONFUSION: 0
POLYPHAGIA: 0
SEIZURES: 0
DIZZINESS: 0

## 2025-02-25 ASSESSMENT — PATIENT HEALTH QUESTIONNAIRE - PHQ9
1. LITTLE INTEREST OR PLEASURE IN DOING THINGS: NOT AT ALL
SUM OF ALL RESPONSES TO PHQ9 QUESTIONS 1 AND 2: 0
2. FEELING DOWN, DEPRESSED OR HOPELESS: NOT AT ALL

## 2025-02-25 NOTE — PROGRESS NOTES
Subjective   Patient ID: Tammy Rena Lepley is a 54 y.o. female who presents for Annual Exam (Pt is here due to annual physical).    HPI     Patient comes for Physical Exam and Follow up visit.     Patient has been feeling pretty good and has been complaint with prescribed medications.    We reviewed blood work including CBC, CMP, TSH, Lipid Panel, HbA1c, Vit D level in 2024.  Results within acceptable range except elevated cholesterol and TG.    Mammogram: 2/2025  PAP: per GYN (done in 2025)  Colon ca screening: cologuard test submitted yesterday  CTCAC: 2023: 0    Patient started Semaglutide for weight loss in April 2024, lost about 30 lbs.    Having more nausea on semaglutide 2.4 mg weekly, will decrease dose to 1.7 and we will monitor closely.    Patient has been following with [facility] pharmacist, I have been communicating with pharmacist regularly and supervising treatment.     With weight loss her BP has been better controlled, she discontinued Triam/hydrochlorothiazide.    She has h/o borderline DM, HTN, HLD, Hypothyroidism, menopause at age 50, weight problems, varicose veins, OA,  Gestational diabetes with second pregnancy.  Fam hx:  Father: CAD, s/stent, CVA, blood clots  Sister: MI at age 30, blood clots  Heart problems on father's side of family  Mother: CHF    Follows with GYN and Functional medicine.  Advised to start Vit d supplement with K2 125 mcg daily.         Review of Systems   Constitutional: Negative.  Negative for activity change, appetite change and unexpected weight change.   HENT: Negative.  Negative for congestion, ear discharge, ear pain, hearing loss, mouth sores, nosebleeds, sinus pressure, sinus pain, sore throat, trouble swallowing and voice change.    Eyes: Negative.  Negative for pain, discharge, redness and visual disturbance.   Respiratory: Negative.  Negative for cough, chest tightness, shortness of breath, wheezing and stridor.    Cardiovascular: Negative.  Negative for chest pain,  "palpitations and leg swelling.   Gastrointestinal: Negative.  Negative for abdominal pain, blood in stool, constipation, diarrhea, nausea and vomiting.   Endocrine: Negative.  Negative for polydipsia, polyphagia and polyuria.   Genitourinary: Negative.  Negative for difficulty urinating, dysuria, flank pain, frequency and urgency.   Musculoskeletal: Negative.  Negative for arthralgias, back pain, gait problem, joint swelling, myalgias, neck pain and neck stiffness.   Skin: Negative.  Negative for color change, rash and wound.   Allergic/Immunologic: Negative.  Negative for environmental allergies, food allergies and immunocompromised state.   Neurological: Negative.  Negative for dizziness, tremors, seizures, syncope, facial asymmetry, speech difficulty, weakness, light-headedness, numbness and headaches.   Hematological: Negative.  Negative for adenopathy. Does not bruise/bleed easily.   Psychiatric/Behavioral: Negative.  Negative for agitation, behavioral problems, confusion, hallucinations, sleep disturbance and suicidal ideas. The patient is not nervous/anxious.    All other systems reviewed and are negative.      Objective   /80 (BP Location: Left arm, Patient Position: Sitting, BP Cuff Size: Adult)   Pulse 68   Temp 36.7 °C (98 °F) (Temporal)   Resp 16   Ht 1.499 m (4' 11\")   Wt 73.7 kg (162 lb 6.4 oz)   SpO2 100%   BMI 32.80 kg/m²     Physical Exam  Vitals and nursing note reviewed.   Constitutional:       General: She is not in acute distress.     Appearance: Normal appearance.   HENT:      Head: Normocephalic and atraumatic.      Right Ear: Tympanic membrane, ear canal and external ear normal.      Left Ear: Tympanic membrane, ear canal and external ear normal.      Nose: Nose normal. No congestion or rhinorrhea.      Mouth/Throat:      Mouth: Mucous membranes are moist.      Pharynx: Oropharynx is clear.   Eyes:      General:         Right eye: No discharge.         Left eye: No discharge.     "  Extraocular Movements: Extraocular movements intact.      Conjunctiva/sclera: Conjunctivae normal.      Pupils: Pupils are equal, round, and reactive to light.   Cardiovascular:      Rate and Rhythm: Normal rate and regular rhythm.      Pulses: Normal pulses.      Heart sounds: Normal heart sounds. No murmur heard.     No friction rub. No gallop.   Pulmonary:      Effort: Pulmonary effort is normal. No respiratory distress.      Breath sounds: Normal breath sounds. No stridor. No wheezing, rhonchi or rales.   Chest:      Chest wall: No tenderness.   Abdominal:      General: Bowel sounds are normal.      Palpations: Abdomen is soft. There is no mass.      Tenderness: There is no abdominal tenderness. There is no guarding or rebound.   Musculoskeletal:         General: No swelling or deformity. Normal range of motion.      Cervical back: Normal range of motion and neck supple. No rigidity or tenderness.      Right lower leg: No edema.      Left lower leg: No edema.   Lymphadenopathy:      Cervical: No cervical adenopathy.   Skin:     General: Skin is warm and dry.      Coloration: Skin is not jaundiced.      Findings: No bruising or erythema.   Neurological:      General: No focal deficit present.      Mental Status: She is alert and oriented to person, place, and time. Mental status is at baseline.      Cranial Nerves: No cranial nerve deficit.      Motor: No weakness.      Coordination: Coordination normal.      Gait: Gait normal.   Psychiatric:         Mood and Affect: Mood normal.         Behavior: Behavior normal.         Thought Content: Thought content normal.         Judgment: Judgment normal.         Assessment/Plan   Problem List Items Addressed This Visit             ICD-10-CM       Cardiac and Vasculature    Primary hypertension I10     Controlled. Continue  DASH diet.         Hyperlipidemia E78.5     Low cholesterol diet is advised.          Relevant Orders    ECG 12 lead (Clinic Performed) (Completed)        Endocrine/Metabolic    Prediabetes R73.03    Relevant Orders    Hemoglobin A1C    Vitamin D deficiency E55.9    Relevant Orders    Vitamin D 25-Hydroxy,Total (for eval of Vitamin D levels)    Hypothyroidism E03.9     Clinically stable. Continue Levothyroxine.            Health Encounters    Healthcare maintenance - Primary Z00.00    Relevant Orders    CBC    Comprehensive Metabolic Panel    Lipid Panel    TSH with reflex to Free T4 if abnormal    Urinalysis with Reflex Microscopic    ECG 12 lead (Clinic Performed) (Completed)     It was a pleasure to see you today.  I would like to remind you about importance of a healthy lifestyle in order to improve your well-being and live longer.  Try to engage in physical activities for at least 150 minutes per week.  Eat about 10 servings of fruits and vegetables daily. My advice is 2 servings of fruits and 8 servings of vegetables.  For vegetables choose at least half of them green and at least half of them fresh.  Please avoid sugar, salt, fried food and saturated fat.      I spent a total of 30 minutes on the date of service for follow up visit, which included preparing to see the patient, face-to-face patient care, completing clinical documentation, obtaining and/or reviewing separately obtained history, performing a medically appropriate examination, counseling and educating the patient/family/caregiver, ordering medications, tests, or procedures, communicating with other health care providers (not separately reported), independently interpreting results (not separately reported), communicating results to the patient/family/caregiver, and care coordination (not separately reported).    F/up in 6 months or sooner if needed.

## 2025-02-26 LAB
25(OH)D3+25(OH)D2 SERPL-MCNC: 35 NG/ML (ref 30–100)
ALBUMIN SERPL-MCNC: 4.3 G/DL (ref 3.6–5.1)
ALP SERPL-CCNC: 75 U/L (ref 37–153)
ALT SERPL-CCNC: 4 U/L (ref 6–29)
ANION GAP SERPL CALCULATED.4IONS-SCNC: 7 MMOL/L (CALC) (ref 7–17)
APPEARANCE UR: CLEAR
AST SERPL-CCNC: 5 U/L (ref 10–35)
BILIRUB SERPL-MCNC: 0.3 MG/DL (ref 0.2–1.2)
BILIRUB UR QL STRIP: NEGATIVE
BUN SERPL-MCNC: 11 MG/DL (ref 7–25)
CALCIUM SERPL-MCNC: 9.7 MG/DL (ref 8.6–10.4)
CHLORIDE SERPL-SCNC: 104 MMOL/L (ref 98–110)
CHOLEST SERPL-MCNC: 263 MG/DL
CHOLEST/HDLC SERPL: 4 (CALC)
CO2 SERPL-SCNC: 30 MMOL/L (ref 20–32)
COLOR UR: YELLOW
CREAT SERPL-MCNC: 0.8 MG/DL (ref 0.5–1.03)
EGFRCR SERPLBLD CKD-EPI 2021: 88 ML/MIN/1.73M2
ERYTHROCYTE [DISTWIDTH] IN BLOOD BY AUTOMATED COUNT: 12.5 % (ref 11–15)
EST. AVERAGE GLUCOSE BLD GHB EST-MCNC: 108 MG/DL
EST. AVERAGE GLUCOSE BLD GHB EST-SCNC: 6 MMOL/L
GLUCOSE SERPL-MCNC: 86 MG/DL (ref 65–99)
GLUCOSE UR QL STRIP: NEGATIVE
HBA1C MFR BLD: 5.4 % OF TOTAL HGB
HCT VFR BLD AUTO: 43 % (ref 35–45)
HDLC SERPL-MCNC: 65 MG/DL
HGB BLD-MCNC: 13.8 G/DL (ref 11.7–15.5)
HGB UR QL STRIP: NEGATIVE
KETONES UR QL STRIP: NEGATIVE
LDLC SERPL CALC-MCNC: 162 MG/DL (CALC)
LEUKOCYTE ESTERASE UR QL STRIP: NEGATIVE
MCH RBC QN AUTO: 31.2 PG (ref 27–33)
MCHC RBC AUTO-ENTMCNC: 32.1 G/DL (ref 32–36)
MCV RBC AUTO: 97.1 FL (ref 80–100)
NITRITE UR QL STRIP: NEGATIVE
NONHDLC SERPL-MCNC: 198 MG/DL (CALC)
PH UR STRIP: 6 [PH] (ref 5–8)
PLATELET # BLD AUTO: 344 THOUSAND/UL (ref 140–400)
PMV BLD REES-ECKER: 10.2 FL (ref 7.5–12.5)
POTASSIUM SERPL-SCNC: 4.7 MMOL/L (ref 3.5–5.3)
PROT SERPL-MCNC: 7.3 G/DL (ref 6.1–8.1)
PROT UR QL STRIP: NEGATIVE
RBC # BLD AUTO: 4.43 MILLION/UL (ref 3.8–5.1)
SODIUM SERPL-SCNC: 141 MMOL/L (ref 135–146)
SP GR UR STRIP: 1.02 (ref 1–1.03)
TRIGL SERPL-MCNC: 204 MG/DL
TSH SERPL-ACNC: 2.17 MIU/L
WBC # BLD AUTO: 6.4 THOUSAND/UL (ref 3.8–10.8)

## 2025-02-28 NOTE — RESULT ENCOUNTER NOTE
Your recent lab work was acceptable except mildly elevated cholesterol and Tg.  Low cholesterol and low carbohydrate diet is advised. . All results may not be within normal range but they are not clinically significant at this time and do not require change in your therapy. We will discuss details during your next office visit. Please keep your next appointment as scheduled. Dr. Mueller

## 2025-03-12 DIAGNOSIS — E66.812 CLASS 2 SEVERE OBESITY DUE TO EXCESS CALORIES WITH SERIOUS COMORBIDITY AND BODY MASS INDEX (BMI) OF 38.0 TO 38.9 IN ADULT: ICD-10-CM

## 2025-03-12 DIAGNOSIS — E66.01 CLASS 2 SEVERE OBESITY DUE TO EXCESS CALORIES WITH SERIOUS COMORBIDITY AND BODY MASS INDEX (BMI) OF 38.0 TO 38.9 IN ADULT: ICD-10-CM

## 2025-03-12 RX ORDER — SEMAGLUTIDE 1.7 MG/.75ML
1.7 INJECTION, SOLUTION SUBCUTANEOUS WEEKLY
Qty: 3 ML | Refills: 0 | OUTPATIENT
Start: 2025-03-12 | End: 2025-04-03

## 2025-03-18 ENCOUNTER — APPOINTMENT (OUTPATIENT)
Dept: PHARMACY | Facility: HOSPITAL | Age: 55
End: 2025-03-18
Payer: COMMERCIAL

## 2025-03-18 DIAGNOSIS — E66.812 CLASS 2 SEVERE OBESITY DUE TO EXCESS CALORIES WITH SERIOUS COMORBIDITY AND BODY MASS INDEX (BMI) OF 38.0 TO 38.9 IN ADULT: ICD-10-CM

## 2025-03-18 DIAGNOSIS — E66.01 CLASS 2 SEVERE OBESITY DUE TO EXCESS CALORIES WITH SERIOUS COMORBIDITY AND BODY MASS INDEX (BMI) OF 38.0 TO 38.9 IN ADULT: ICD-10-CM

## 2025-03-18 DIAGNOSIS — E66.01 CLASS 2 SEVERE OBESITY DUE TO EXCESS CALORIES WITH SERIOUS COMORBIDITY AND BODY MASS INDEX (BMI) OF 35.0 TO 35.9 IN ADULT: Primary | ICD-10-CM

## 2025-03-18 DIAGNOSIS — E66.812 CLASS 2 SEVERE OBESITY DUE TO EXCESS CALORIES WITH SERIOUS COMORBIDITY AND BODY MASS INDEX (BMI) OF 35.0 TO 35.9 IN ADULT: Primary | ICD-10-CM

## 2025-03-18 RX ORDER — BISMUTH SUBSALICYLATE 262 MG
1 TABLET,CHEWABLE ORAL DAILY
COMMUNITY

## 2025-03-18 RX ORDER — VIT C/E/ZN/COPPR/LUTEIN/ZEAXAN 250MG-90MG
25 CAPSULE ORAL DAILY
COMMUNITY

## 2025-03-18 NOTE — ASSESSMENT & PLAN NOTE
Current regimen includes Wegovy 1.7 mg once weekly. Patient's current weight reported as 161 lbs. Lowest reported weight was 159 lbs at last follow-up. Starting weight was 189.4 lbs on 03/21/24. Had lost 30.4 lbs (~16.1% of TBW) since starting therapy plan at her lowest weight. She reports that her nausea has improved and, while she still has some nausea, it has been mild and just after taking her dose. Weight loss has stabilized and she is not losing anymore weight on therapy. Discussed Zepbound with patient and that we typically see some more effectiveness from it. Will submit prior authorization to her insurance company to see if it's approved and what her out of pocket cost would be.     Medication Changes:  CONTINUE:   Wegovy 1.7 mg under the skin once weekly     Future Considerations:  If PA approved for Zepbound and cost is affordable, will discuss with patient switching. Would recommend switching to the 5 mg dose of Zepbound and titrate up.   If Zepbound approved, patient will stop Wegovy therapy and will only be taking Zepbound once weekly.    Monitoring and Education:  Counseled patient on relevant MOA, expectations, side effects, duration of therapy, contraindications, administration, and monitoring parameters.  We will continue to assess for new/worsening side effects at future follow-ups to see if nausea is improving or worsening, and if patient switches to Zepbound, if she has any new side effects   Updated weight will be obtained at next follow-up to assess efficacy of medication therapy     I will reach out to patient once PA determination has been made. She was encouraged to reach out with any questions and/or concerns.

## 2025-03-18 NOTE — PROGRESS NOTES
Patient is sent at the request of Laura Saldaña M* for my opinion regarding weight management.  My final recommendations will be communicated back to the requesting provider by way of shared medical record.    Subjective     Tammy Rena Lepley is a 54 y.o. female with class 2 obesity prior to start of GLP-1 therapy as evidenced by her starting in-office weight of 86.2 kg and calculated BMI of 38.38 kg/m2 on 04/16/24 which indicated her for medication assisted weight loss. Weight related comorbidities include hypertension and hyperlipidemia.     At last pharmacy encounter patient reported continued nausea for a few days after her dose of Wegovy and one episode of vomiting. She was reduced back down to the 1.7 mg once weekly dose. We are following-up today to see how she has done with the reduced dose.     Wegovy PA approved through 04/30/2025.     Since last encounter patient saw Dr. Mueller on 02/25/25. It was noted that patient had stopped the triamterene/hydrochlorothiazide due to her blood pressure being better controlled due to weight loss. No changes were made to her medications at that visit.     Past Medical History:  She has no past medical history on file.    Past Surgical History:  She has no past surgical history on file.    Social History:  She reports that she has never smoked. She has never been exposed to tobacco smoke. She has never used smokeless tobacco. She reports that she does not currently use alcohol. She reports that she does not use drugs.    Family History:  No family history on file.    Allergies:  Patient has no known allergies.    Current diet:   Will eat 2-3 meals per day, will either skip breakfast or lunch depending on how busy her workday is. She adds that she was also on vacation for a week while on Wegovy 1.7 mg so it was harder to find. Wegovy 1.7 mg increase notes that she is eating minimally. She notes that she can work through lunch and dinner without noticing. She tries  to keep hands on healthy snacks around.   Breakfast: Currently finds herself eating a protein breakfast bar or the breakfast drinks  Lunch: varies - does pack her lunch; has been trying to take a salad or turkey/chicken sandwich, will also have the lean cuisine or healthy frozen microwave meals   Dinner: if she cooks she cooks for her whole family which makes it challenging to stick to a diet, will typically have a protein, vegetable, sometimes more pasta and carbs than other times   Snacks: occasionally will snack after dinner - will have a snack bar or a cake, sometimes chips   Fluids: coffee and water, once in a while will have a soda  Generally eats smaller portion sizes  Eats out about once per week   Is generally trying to eat healthier     Current exercise:   Walks a lot at work   Has not gotten into any other activity  Does have a fitness set with weights and an elliptical   Has been having some trouble with her knee after falling, wants to get it checked before using the elliptical machine more  Has been walking more due to warmer weather and has been gardening and working around the house as well     Previous Weight Loss Attempts:   Has tried Mary Jaswinder - states she previously has done well with this but was unable to keep up with it due to cost   Weight Watchers off and on, states this helps but wasn't able to keep up with it consistently long-term  States that obesity does run in her family (mother, sister, grandmothers)  Has tried intermittent fasting in the past   Has tried general diets and exercising more   Has seen a weight loss doctor in the past, took Metformin but states she didn't like the way it made her feel (noted GI upset and brain fog)    The patient does not have a known family history of diabetes. Was diagnosed with gestational diabetes with her second pregnancy.     Adverse Effects:   Notes some improvement in nausea, notes some still when she first takes her medications but it has been  "mild     Objective     Medication Reconciliation:   No changes were made to patient's medication list during encounter today    Current Outpatient Medications on File Prior to Visit   Medication Sig Dispense Refill    cholecalciferol (Vitamin D-3) 25 MCG (1000 UT) capsule Take 1 capsule (25 mcg) by mouth once daily.      fluocinolone (Synalar) 0.025 % ointment Apply a pea-sized amount to affected vulvar area twice weekly. May use 1-2 times daily for 1 to 2 (TWO) weeks as needed for flareups]      ibuprofen (Motrin) capsule Take 1 capsule (200 mg) by mouth.      levothyroxine (Synthroid, Levoxyl) 50 mcg tablet TAKE 1 TABLET BY MOUTH EVERY DAY 90 tablet 3    multivitamin tablet Take 1 tablet by mouth once daily.      Osphena 60 mg tablet Take 1 tablet by mouth once daily.      semaglutide, weight loss, (Wegovy) 1.7 mg/0.75 mL pen injector Inject 1.7 mg under the skin 1 (one) time per week for 4 doses. 3 mL 0    [DISCONTINUED] calcium carbonate-vitamin D3 (Caltrate 600 plus D) 600 mg-20 mcg (800 unit) tablet,chewable Chew 1 tablet once daily.       No current facility-administered medications on file prior to visit.      Last Recorded Vitals:  BP Readings from Last 6 Encounters:   02/25/25 130/80   03/21/24 130/82   10/04/23 115/81   05/15/23 133/87   04/10/23 138/86     Wt Readings from Last 6 Encounters:   02/25/25 73.7 kg (162 lb 6.4 oz)   04/16/24 86.2 kg (190 lb)   03/21/24 85.9 kg (189 lb 6.4 oz)   10/04/23 87.2 kg (192 lb 3.2 oz)   05/15/23 89.4 kg (197 lb)   04/10/23 90.9 kg (200 lb 6.4 oz)     Estimated body mass index is 32.8 kg/m² as calculated from the following:    Height as of 2/25/25: 1.499 m (4' 11\").    Weight as of 2/25/25: 73.7 kg (162 lb 6.4 oz).    Patient Reported Home Weights:   05/14/24: 184 lbs    06/11/24: 182 lbs   07/09/24: 180 lbs (went on vacation which may have limited her weight loss)  08/06/24: 175.6 lbs  09/03/24: 173.6 lbs    10/01/24: 172.8 lbs   10/29/24: 168 lbs   11/26/24: 167 lbs " "  02/18/25: 159 lbs  03/18/25: 161 lbs     Weight Loss Pharmacotherapy:  Wegovy 1.7 mg once weekly (Wednesdays)    Historical Weight Loss Pharmacotherapy:   Metformin (didn't tolerate it well and did not feel that it did a lot to help her lose weight)    Primary/Secondary Prevention   Statin? No    Pertinent PMH Review:  PMH of Pancreatitis: No  PMH of Retinopathy: No  PMH of MTC/MEN 2: No    Lab Review  Lab Results   Component Value Date    BILITOT 0.3 02/25/2025    CALCIUM 9.7 02/25/2025    CO2 30 02/25/2025     02/25/2025    CREATININE 0.80 02/25/2025    GLUCOSE 86 02/25/2025    ALKPHOS 75 02/25/2025    K 4.7 02/25/2025    PROT 7.3 02/25/2025     02/25/2025    AST 5 (L) 02/25/2025    ALT 4 (L) 02/25/2025    BUN 11 02/25/2025    ANIONGAP 7 02/25/2025    ALBUMIN 4.3 02/25/2025    GFRF 77 04/22/2023     Lab Results   Component Value Date    TRIG 204 (H) 02/25/2025    CHOL 263 (H) 02/25/2025    LDLCALC 162 (H) 02/25/2025    HDL 65 02/25/2025     Lab Results   Component Value Date    HGBA1C 5.4 02/25/2025    HGBA1C 5.8 (H) 03/21/2024    HGBA1C 5.8 (A) 04/22/2023     No components found for: \"UACR\"  The 10-year ASCVD risk score (Marisabel MCRAE, et al., 2019) is: 1.9%    Values used to calculate the score:      Age: 54 years      Sex: Female      Is Non- : No      Diabetic: No      Tobacco smoker: No      Systolic Blood Pressure: 130 mmHg      Is BP treated: No      HDL Cholesterol: 65 mg/dL      Total Cholesterol: 263 mg/dL    Health Maintenance:   Lipid Panel:  mg/dL,  mg/dL 03/21/24  Influenza Immunization: typically does not receive    Drug Interactions:  No significant drug-drug interactions exist requiring adjustment to medication therapy.     Assessment/Plan   Problem List Items Addressed This Visit       Class 2 severe obesity due to excess calories with serious comorbidity and body mass index (BMI) of 35.0 to 35.9 in adult - Primary     Current regimen includes Wegovy " 1.7 mg once weekly. Patient's current weight reported as 161 lbs. Lowest reported weight was 159 lbs at last follow-up. Starting weight was 189.4 lbs on 03/21/24. Had lost 30.4 lbs (~16.1% of TBW) since starting therapy plan at her lowest weight. She reports that her nausea has improved and, while she still has some nausea, it has been mild and just after taking her dose. Weight loss has stabilized and she is not losing anymore weight on therapy. Discussed Zepbound with patient and that we typically see some more effectiveness from it. Will submit prior authorization to her insurance company to see if it's approved and what her out of pocket cost would be.     Medication Changes:  CONTINUE:   Wegovy 1.7 mg under the skin once weekly     Future Considerations:  If PA approved for Zepbound and cost is affordable, will discuss with patient switching. Would recommend switching to the 5 mg dose of Zepbound and titrate up.     Monitoring and Education:  Counseled patient on relevant MOA, expectations, side effects, duration of therapy, contraindications, administration, and monitoring parameters.  We will continue to assess for new/worsening side effects at future follow-ups to see if nausea is improving or worsening, and if patient switches to Zepbound, if she has any new side effects   Updated weight will be obtained at next follow-up to assess efficacy of medication therapy     I will reach out to patient once PA determination has been made. She was encouraged to reach out with any questions and/or concerns.           Other Visit Diagnoses       Class 2 severe obesity due to excess calories with serious comorbidity and body mass index (BMI) of 38.0 to 38.9 in adult              Pharmacy Follow-Up: To be determined pending PA determination   PCP Follow-Up: 08/21/2025    Gerry Encarnacion, Pj     Continue all meds under the continuation of care with the referring provider and clinical pharmacy team.

## 2025-03-25 ENCOUNTER — TELEPHONE (OUTPATIENT)
Dept: PHARMACY | Facility: HOSPITAL | Age: 55
End: 2025-03-25
Payer: COMMERCIAL

## 2025-03-25 DIAGNOSIS — E66.01 CLASS 2 SEVERE OBESITY DUE TO EXCESS CALORIES WITH SERIOUS COMORBIDITY AND BODY MASS INDEX (BMI) OF 38.0 TO 38.9 IN ADULT: Primary | ICD-10-CM

## 2025-03-25 DIAGNOSIS — E66.812 CLASS 2 SEVERE OBESITY DUE TO EXCESS CALORIES WITH SERIOUS COMORBIDITY AND BODY MASS INDEX (BMI) OF 38.0 TO 38.9 IN ADULT: Primary | ICD-10-CM

## 2025-03-25 PROCEDURE — RXMED WILLOW AMBULATORY MEDICATION CHARGE

## 2025-03-25 NOTE — TELEPHONE ENCOUNTER
Prior Authorization Approval    Prior authorization approved for Zepbound from 03/18/2025 until 10/21/2025. CMM Key: [id number]     Spoke with patient today to notify. Discussed differences in administration of Zepbound vs Wegovy:   Remove gray cap of Zepbound  Unlock pen using dial close to the purple button  Place against the skin, inject in the same areas as Wegovy (abdomen, thigh)  Once pressed against the skin and unlocked, press the purple button to inject the medication.   First click means the injection has started, second click means the injection has completed    Prescription for Zepbound 5 mg once weekly sent to [facility]. We will follow-up on 04/15/25 to see how she has tolerated the switch and to discuss titrating up her dose if tolerable.     Please reach out to clinical pharmacy team with any questions/concerns.    Thank you,  Gerry Encarnacion, PharmD

## 2025-03-31 ENCOUNTER — PHARMACY VISIT (OUTPATIENT)
Dept: PHARMACY | Facility: CLINIC | Age: 55
End: 2025-03-31
Payer: COMMERCIAL

## 2025-04-15 ENCOUNTER — APPOINTMENT (OUTPATIENT)
Dept: PHARMACY | Facility: HOSPITAL | Age: 55
End: 2025-04-15
Payer: COMMERCIAL

## 2025-04-15 DIAGNOSIS — E66.812 CLASS 2 SEVERE OBESITY DUE TO EXCESS CALORIES WITH SERIOUS COMORBIDITY AND BODY MASS INDEX (BMI) OF 35.0 TO 35.9 IN ADULT: ICD-10-CM

## 2025-04-15 DIAGNOSIS — E66.01 CLASS 2 SEVERE OBESITY DUE TO EXCESS CALORIES WITH SERIOUS COMORBIDITY AND BODY MASS INDEX (BMI) OF 35.0 TO 35.9 IN ADULT: ICD-10-CM

## 2025-04-15 DIAGNOSIS — E66.01 CLASS 2 SEVERE OBESITY DUE TO EXCESS CALORIES WITH SERIOUS COMORBIDITY AND BODY MASS INDEX (BMI) OF 38.0 TO 38.9 IN ADULT: Primary | ICD-10-CM

## 2025-04-15 DIAGNOSIS — E66.812 CLASS 2 SEVERE OBESITY DUE TO EXCESS CALORIES WITH SERIOUS COMORBIDITY AND BODY MASS INDEX (BMI) OF 38.0 TO 38.9 IN ADULT: Primary | ICD-10-CM

## 2025-04-15 PROCEDURE — RXMED WILLOW AMBULATORY MEDICATION CHARGE

## 2025-04-15 NOTE — PROGRESS NOTES
Patient is sent at the request of Laura Saldaña M* for my opinion regarding weight management.  My final recommendations will be communicated back to the requesting provider by way of shared medical record.    Subjective     Tammy Rena Lepley is a 54 y.o. female with class 2 obesity prior to start of GLP-1 therapy as evidenced by her starting in-office weight of 86.2 kg and calculated BMI of 38.38 kg/m2 on 04/16/24 which indicated her for medication assisted weight loss. Weight related comorbidities include hypertension and hyperlipidemia.     At last pharmacy encounter patient reported that nausea had improved when reducing back down to the 1.7 mg once weekly dose, however, weight had remained stable. She was interested in switching to Zepbound if approved through insurance for additional weight loss effects. Zepbound was approved until 10/21/25 and she was switched to the Zepbound 5 mg dose on 03/25/25. We are following-up today to see how she has done with the first few weeks of Zepbound therapy.     Past Medical History:  She has no past medical history on file.    Past Surgical History:  She has no past surgical history on file.    Social History:  She reports that she has never smoked. She has never been exposed to tobacco smoke. She has never used smokeless tobacco. She reports that she does not currently use alcohol. She reports that she does not use drugs.    Family History:  No family history on file.    Allergies:  Patient has no known allergies.    Current diet:   No changes since last pharmacy encounter  Will eat 2-3 meals per day, will either skip breakfast or lunch depending on how busy her workday is  Breakfast: Currently finds herself eating a protein breakfast bar or the breakfast drinks  Lunch: varies - does pack her lunch; has been trying to take a salad or turkey/chicken sandwich, will also have the lean cuisine or healthy frozen microwave meals   Dinner: if she cooks she cooks for her  whole family which makes it challenging to stick to a diet, will typically have a protein, vegetable, sometimes more pasta and carbs than other times   Snacks: occasionally will snack after dinner - will have a snack bar or a cake, sometimes chips   Fluids: coffee and water, once in a while will have a soda  Generally eats smaller portion sizes  Eats out about once per week   Is generally trying to eat healthier     Current exercise:   No changes since last encounter  Walks a lot at work   Does have a fitness set with weights and an elliptical   Has been having some trouble with her knee after falling, wants to get it checked before using the elliptical machine more  Has been walking more due to warmer weather and has been gardening and working around the house as well     Previous Weight Loss Attempts:   Has tried Mary Jaswinder - states she previously has done well with this but was unable to keep up with it due to cost   Weight Watchers off and on, states this helps but wasn't able to keep up with it consistently long-term  States that obesity does run in her family (mother, sister, grandmothers)  Has tried intermittent fasting in the past   Has tried general diets and exercising more   Has seen a weight loss doctor in the past, took Metformin but states she didn't like the way it made her feel (noted GI upset and brain fog)    The patient does not have a known family history of diabetes. Was diagnosed with gestational diabetes with her second pregnancy.     Adverse Effects:   None reported at all today  Notes that nausea has improved compared with Wegovy        Objective     Medication Reconciliation:   No changes were made to patient's medication list during encounter today    Current Outpatient Medications on File Prior to Visit   Medication Sig Dispense Refill    cholecalciferol (Vitamin D-3) 25 MCG (1000 UT) capsule Take 1 capsule (25 mcg) by mouth once daily.      fluocinolone (Synalar) 0.025 % ointment Apply  "a pea-sized amount to affected vulvar area twice weekly. May use 1-2 times daily for 1 to 2 (TWO) weeks as needed for flareups]      ibuprofen (Motrin) capsule Take 1 capsule (200 mg) by mouth.      levothyroxine (Synthroid, Levoxyl) 50 mcg tablet TAKE 1 TABLET BY MOUTH EVERY DAY 90 tablet 3    multivitamin tablet Take 1 tablet by mouth once daily.      Osphena 60 mg tablet Take 1 tablet by mouth once daily.      [DISCONTINUED] tirzepatide, weight loss, (Zepbound) 5 mg/0.5 mL injection Inject 5 mg under the skin every 7 days. 2 mL 0     No current facility-administered medications on file prior to visit.      Last Recorded Vitals:  BP Readings from Last 6 Encounters:   02/25/25 130/80   03/21/24 130/82   10/04/23 115/81   05/15/23 133/87   04/10/23 138/86     Wt Readings from Last 6 Encounters:   02/25/25 73.7 kg (162 lb 6.4 oz)   04/16/24 86.2 kg (190 lb)   03/21/24 85.9 kg (189 lb 6.4 oz)   10/04/23 87.2 kg (192 lb 3.2 oz)   05/15/23 89.4 kg (197 lb)   04/10/23 90.9 kg (200 lb 6.4 oz)     Estimated body mass index is 32.8 kg/m² as calculated from the following:    Height as of 2/25/25: 1.499 m (4' 11\").    Weight as of 2/25/25: 73.7 kg (162 lb 6.4 oz).    Patient Reported Home Weights:   05/14/24: 184 lbs    06/11/24: 182 lbs   07/09/24: 180 lbs (went on vacation which may have limited her weight loss)  08/06/24: 175.6 lbs  09/03/24: 173.6 lbs    10/01/24: 172.8 lbs   10/29/24: 168 lbs   11/26/24: 167 lbs   02/18/25: 159 lbs  03/18/25: 161 lbs   04/15/25: 162 lbs     Weight Loss Pharmacotherapy:  Zepbound 5 mg once weekly (Saturday/Sunday)  Has 2 doses left at home    Historical Weight Loss Pharmacotherapy:   Metformin (didn't tolerate it well and did not feel that it did a lot to help her lose weight)  Wegovy 1.7 mg (switched to Zepbound for additional weight loss effects)    Primary/Secondary Prevention   Statin? No    Pertinent PMH Review:  PMH of Pancreatitis: No  PMH of Retinopathy: No  PMH of MTC/MEN 2: " "No    Lab Review  Lab Results   Component Value Date    BILITOT 0.3 02/25/2025    CALCIUM 9.7 02/25/2025    CO2 30 02/25/2025     02/25/2025    CREATININE 0.80 02/25/2025    GLUCOSE 86 02/25/2025    ALKPHOS 75 02/25/2025    K 4.7 02/25/2025    PROT 7.3 02/25/2025     02/25/2025    AST 5 (L) 02/25/2025    ALT 4 (L) 02/25/2025    BUN 11 02/25/2025    ANIONGAP 7 02/25/2025    ALBUMIN 4.3 02/25/2025    GFRF 77 04/22/2023     Lab Results   Component Value Date    TRIG 204 (H) 02/25/2025    CHOL 263 (H) 02/25/2025    LDLCALC 162 (H) 02/25/2025    HDL 65 02/25/2025     Lab Results   Component Value Date    HGBA1C 5.4 02/25/2025    HGBA1C 5.8 (H) 03/21/2024    HGBA1C 5.8 (A) 04/22/2023     No components found for: \"UACR\"  The 10-year ASCVD risk score (Marisabel MCRAE, et al., 2019) is: 1.9%    Values used to calculate the score:      Age: 54 years      Sex: Female      Is Non- : No      Diabetic: No      Tobacco smoker: No      Systolic Blood Pressure: 130 mmHg      Is BP treated: No      HDL Cholesterol: 65 mg/dL      Total Cholesterol: 263 mg/dL    Health Maintenance:   Lipid Panel:  mg/dL,  mg/dL 03/21/24  Influenza Immunization: typically does not receive    Drug Interactions:  No significant drug-drug interactions exist requiring adjustment to medication therapy.     Assessment/Plan   Problem List Items Addressed This Visit       Class 2 severe obesity due to excess calories with serious comorbidity and body mass index (BMI) of 35.0 to 35.9 in adult     Current regimen includes Zepbound 5 mg once weekly. Patient's current weight reported as 162 lbs. Lowest reported weight was 159 lbs on 02/18/25. Starting weight was 189.4 lbs on 03/21/24. Had lost 30.4 lbs (~16.1% of TBW) since starting therapy plan at her lowest weight. At last pharmacy encounter we discussed switching to Zepbound from Wegovy to see if she tolerates it better and gets more effectiveness regarding weight loss. " Since switching to Zepbound patient does note that her nausea has been improved. Weight has remained relatively stable, which was anticipated as she transitioned to the 5 mg dose of Zepbound to avoid side effects with transitioning therapy. She is agreeable to titrating up to the 7.5 mg dose.     Medication Changes:  INCREASE:   Zepbound 7.5 mg under the skin once weekly (increased from 5 mg)  Will give last 2 doses of 5 mg over the next 2 weekends  Will increase up to the 7.5 mg dose the first weekend in May      Future Considerations:  If patient is tolerable to the 7.5 mg dose of Zepbound, could titrate up further to the 10 mg dose at next follow-up    Monitoring and Education:  Counseled patient on relevant MOA, expectations, side effects, duration of therapy, contraindications, administration, and monitoring parameters.  We will continue to assess for new/worsening side effects at future follow-ups, patient informed with dose increases it is possible that side effects may worsen. Since she has done well with the 5 mg dose so far hopefully any new/worse side effects are still manageable   Updated weight will be obtained at next follow-up to assess efficacy of medication therapy     We will plan to follow-up in ~5 weeks to see how she has done with the increased dose of Zepbound. She was encouraged to reach out with any questions and/or concerns prior to then.           Other Visit Diagnoses       Class 2 severe obesity due to excess calories with serious comorbidity and body mass index (BMI) of 38.0 to 38.9 in adult    -  Primary    Relevant Medications    tirzepatide, weight loss, (Zepbound) 7.5 mg/0.5 mL injection    Other Relevant Orders    Referral to Clinical Pharmacy          Pharmacy Follow-Up: 05/20/2025   PCP Follow-Up: 08/21/2025    Gerry Encarnacion PharmD     Continue all meds under the continuation of care with the referring provider and clinical pharmacy team.

## 2025-04-15 NOTE — ASSESSMENT & PLAN NOTE
Current regimen includes Zepbound 5 mg once weekly. Patient's current weight reported as 162 lbs. Lowest reported weight was 159 lbs on 02/18/25. Starting weight was 189.4 lbs on 03/21/24. Had lost 30.4 lbs (~16.1% of TBW) since starting therapy plan at her lowest weight. At last pharmacy encounter we discussed switching to Zepbound from Wevy to see if she tolerates it better and gets more effectiveness regarding weight loss. Since switching to Zepbound patient does note that her nausea has been improved. Weight has remained relatively stable, which was anticipated as she transitioned to the 5 mg dose of Zepbound to avoid side effects with transitioning therapy. She is agreeable to titrating up to the 7.5 mg dose.     Medication Changes:  INCREASE:   Zepbound 7.5 mg under the skin once weekly (increased from 5 mg)  Will give last 2 doses of 5 mg over the next 2 weekends  Will increase up to the 7.5 mg dose the first weekend in May      Future Considerations:  If patient is tolerable to the 7.5 mg dose of Zepbound, could titrate up further to the 10 mg dose at next follow-up    Monitoring and Education:  Counseled patient on relevant MOA, expectations, side effects, duration of therapy, contraindications, administration, and monitoring parameters.  We will continue to assess for new/worsening side effects at future follow-ups, patient informed with dose increases it is possible that side effects may worsen. Since she has done well with the 5 mg dose so far hopefully any new/worse side effects are still manageable   Updated weight will be obtained at next follow-up to assess efficacy of medication therapy     We will plan to follow-up in ~5 weeks to see how she has done with the increased dose of Zepbound. She was encouraged to reach out with any questions and/or concerns prior to then.

## 2025-04-19 ENCOUNTER — PHARMACY VISIT (OUTPATIENT)
Dept: PHARMACY | Facility: CLINIC | Age: 55
End: 2025-04-19
Payer: COMMERCIAL

## 2025-05-20 ENCOUNTER — APPOINTMENT (OUTPATIENT)
Dept: PHARMACY | Facility: HOSPITAL | Age: 55
End: 2025-05-20
Payer: COMMERCIAL

## 2025-05-20 DIAGNOSIS — E66.01 CLASS 2 SEVERE OBESITY DUE TO EXCESS CALORIES WITH SERIOUS COMORBIDITY AND BODY MASS INDEX (BMI) OF 38.0 TO 38.9 IN ADULT: Primary | ICD-10-CM

## 2025-05-20 DIAGNOSIS — E66.01 CLASS 2 SEVERE OBESITY DUE TO EXCESS CALORIES WITH SERIOUS COMORBIDITY AND BODY MASS INDEX (BMI) OF 35.0 TO 35.9 IN ADULT: ICD-10-CM

## 2025-05-20 DIAGNOSIS — E66.812 CLASS 2 SEVERE OBESITY DUE TO EXCESS CALORIES WITH SERIOUS COMORBIDITY AND BODY MASS INDEX (BMI) OF 35.0 TO 35.9 IN ADULT: ICD-10-CM

## 2025-05-20 DIAGNOSIS — E66.812 CLASS 2 SEVERE OBESITY DUE TO EXCESS CALORIES WITH SERIOUS COMORBIDITY AND BODY MASS INDEX (BMI) OF 38.0 TO 38.9 IN ADULT: Primary | ICD-10-CM

## 2025-05-20 PROCEDURE — RXMED WILLOW AMBULATORY MEDICATION CHARGE

## 2025-05-20 NOTE — ASSESSMENT & PLAN NOTE
Current regimen includes Zepbound 7.5 mg once weekly. Patient's current weight reported as 162 lbs. Lowest reported weight was 159 lbs on 02/18/25. Starting weight was 189.4 lbs on 03/21/24. Had lost 30.4 lbs (~16.1% of TBW) since starting therapy plan at her lowest weight. Weight has remained stable since last encounter despite titrating up to the 7.5 mg dose. She reports generally doing well with the Zepbound, some occasional nausea around when she gives her dose and some occasional off and on constipation but these have been mild and manageable. She would like to titrate up to the next dose.     Medication Changes:  INCREASE:   Zepbound 10 mg under the skin once weekly (increased from 7.5 mg)    Future Considerations:  If patient is tolerable to the 10 mg dose of Zepbound, could titrate up further to the 12.5 mg dose at next follow-up if insurance is still covering    Monitoring and Education:  Counseled patient on relevant MOA, expectations, side effects, duration of therapy, contraindications, administration, and monitoring parameters.  We will continue to assess for new/worsening side effects at future follow-ups, patient informed with dose increases it is possible that side effects may worsen. Since she has done well so far hopefully anything new is mild and manageable  Updated weight will be obtained at next follow-up to assess efficacy of medication therapy   We did discuss the change to her insurance coverage, she is unsure when they will stop covering weight loss medications. I will run a test claim prior to each follow-up to check for any changes to her coverage.     We will plan to follow-up in ~1 month to see how she has done with the increased dose of Zepbound. She was encouraged to reach out with any questions and/or concerns prior to then.

## 2025-05-20 NOTE — PROGRESS NOTES
Patient is sent at the request of Laura Saldaña M* for my opinion regarding weight management.  My final recommendations will be communicated back to the requesting provider by way of shared medical record.    Subjective     Tammy Rena Lepley is a 54 y.o. female with class 2 obesity prior to start of GLP-1 therapy as evidenced by her starting in-office weight of 86.2 kg and calculated BMI of 38.38 kg/m2 on 04/16/24 which indicated her for medication assisted weight loss. Weight related comorbidities include hypertension and hyperlipidemia.     At last pharmacy encounter, patient was tolerating the switch to Zepbound well and noted that her nausea had improved. She was titrated up to the 7.5 mg once weekly dose. We are following-up today to see how she has done with the increase.     Today notes that at some point during the year her insurance will stop covering weight loss medications. Is not sure when, thinks it may be until her prior authorization runs out in October.     Past Medical History:  She has no past medical history on file.    Past Surgical History:  She has no past surgical history on file.    Social History:  She reports that she has never smoked. She has never been exposed to tobacco smoke. She has never used smokeless tobacco. She reports that she does not currently use alcohol. She reports that she does not use drugs.    Family History:  No family history on file.    Allergies:  Patient has no known allergies.    Current diet:   No changes since last pharmacy encounter  Will eat 2-3 meals per day, will either skip breakfast or lunch depending on how busy her workday is  Breakfast: Currently finds herself eating a protein breakfast bar or the breakfast drinks  Lunch: varies - does pack her lunch; has been trying to take a salad or turkey/chicken sandwich, will also have the lean cuisine or healthy frozen microwave meals   Dinner: if she cooks she cooks for her whole family which makes it  challenging to stick to a diet, will typically have a protein, vegetable, sometimes more pasta and carbs than other times   Snacks: occasionally will snack after dinner - will have a snack bar or a cake, sometimes chips   Fluids: coffee and water, once in a while will have a soda  Generally eats smaller portion sizes  Eats out about once per week   Is generally trying to eat healthier     Current exercise:   No changes since last encounter  Walks a lot at work   Does have a fitness set with weights and an elliptical   Has been having some trouble with her knee after falling, wants to get it checked before using the elliptical machine more  Has been walking more due to warmer weather and has been gardening and working around the house as well     Previous Weight Loss Attempts:   Has tried Mary Jaswinder - states she previously has done well with this but was unable to keep up with it due to cost   Weight Watchers off and on, states this helps but wasn't able to keep up with it consistently long-term  States that obesity does run in her family (mother, sister, grandmothers)  Has tried intermittent fasting in the past   Has tried general diets and exercising more   Has seen a weight loss doctor in the past, took Metformin but states she didn't like the way it made her feel (noted GI upset and brain fog)    The patient does not have a known family history of diabetes. Was diagnosed with gestational diabetes with her second pregnancy.     Adverse Effects:   May have some nausea around when she gives her shot but then improves fairly quickly   Occasional constipation off and on but has been pretty manageable       Objective     Medication Reconciliation:   No changes reported by patient today     Current Outpatient Medications on File Prior to Visit   Medication Sig Dispense Refill    cholecalciferol (Vitamin D-3) 25 MCG (1000 UT) capsule Take 1 capsule (25 mcg) by mouth once daily.      fluocinolone (Synalar) 0.025 %  "ointment Apply a pea-sized amount to affected vulvar area twice weekly. May use 1-2 times daily for 1 to 2 (TWO) weeks as needed for flareups]      ibuprofen (Motrin) capsule Take 1 capsule (200 mg) by mouth.      levothyroxine (Synthroid, Levoxyl) 50 mcg tablet TAKE 1 TABLET BY MOUTH EVERY DAY 90 tablet 3    multivitamin tablet Take 1 tablet by mouth once daily.      Osphena 60 mg tablet Take 1 tablet by mouth once daily.      [DISCONTINUED] tirzepatide, weight loss, (Zepbound) 7.5 mg/0.5 mL injection Inject 7.5 mg under the skin every 7 days. 2 mL 0     No current facility-administered medications on file prior to visit.      Last Recorded Vitals:  BP Readings from Last 6 Encounters:   02/25/25 130/80   03/21/24 130/82   10/04/23 115/81   05/15/23 133/87   04/10/23 138/86     Wt Readings from Last 6 Encounters:   02/25/25 73.7 kg (162 lb 6.4 oz)   04/16/24 86.2 kg (190 lb)   03/21/24 85.9 kg (189 lb 6.4 oz)   10/04/23 87.2 kg (192 lb 3.2 oz)   05/15/23 89.4 kg (197 lb)   04/10/23 90.9 kg (200 lb 6.4 oz)     Estimated body mass index is 32.8 kg/m² as calculated from the following:    Height as of 2/25/25: 1.499 m (4' 11\").    Weight as of 2/25/25: 73.7 kg (162 lb 6.4 oz).    Patient Reported Home Weights:   05/14/24: 184 lbs    06/11/24: 182 lbs   07/09/24: 180 lbs (went on vacation which may have limited her weight loss)  08/06/24: 175.6 lbs  09/03/24: 173.6 lbs    10/01/24: 172.8 lbs   10/29/24: 168 lbs   11/26/24: 167 lbs   02/18/25: 159 lbs  03/18/25: 161 lbs   04/15/25: 162 lbs   05/20/25: 162 lbs     Goal Weight: ~150 lbs      Weight Loss Pharmacotherapy:  Zepbound 7.5 mg once weekly (Saturday/Sunday)  Has 1 dose left at home    Historical Weight Loss Pharmacotherapy:   Metformin (didn't tolerate it well and did not feel that it did a lot to help her lose weight)  Wegovy 1.7 mg (switched to Zepbound for additional weight loss effects)    Primary/Secondary Prevention   Statin? No    Pertinent PMH Review:  PMH " "of Pancreatitis: No  PMH of Retinopathy: No  PMH of MTC/MEN 2: No    Lab Review  Lab Results   Component Value Date    BILITOT 0.3 02/25/2025    CALCIUM 9.7 02/25/2025    CO2 30 02/25/2025     02/25/2025    CREATININE 0.80 02/25/2025    GLUCOSE 86 02/25/2025    ALKPHOS 75 02/25/2025    K 4.7 02/25/2025    PROT 7.3 02/25/2025     02/25/2025    AST 5 (L) 02/25/2025    ALT 4 (L) 02/25/2025    BUN 11 02/25/2025    ANIONGAP 7 02/25/2025    ALBUMIN 4.3 02/25/2025    GFRF 77 04/22/2023     Lab Results   Component Value Date    TRIG 204 (H) 02/25/2025    CHOL 263 (H) 02/25/2025    LDLCALC 162 (H) 02/25/2025    HDL 65 02/25/2025     Lab Results   Component Value Date    HGBA1C 5.4 02/25/2025    HGBA1C 5.8 (H) 03/21/2024    HGBA1C 5.8 (A) 04/22/2023     No components found for: \"UACR\"  The 10-year ASCVD risk score (Marisabel MCRAE, et al., 2019) is: 1.9%    Values used to calculate the score:      Age: 54 years      Sex: Female      Is Non- : No      Diabetic: No      Tobacco smoker: No      Systolic Blood Pressure: 130 mmHg      Is BP treated: No      HDL Cholesterol: 65 mg/dL      Total Cholesterol: 263 mg/dL    Health Maintenance:   Lipid Panel:  mg/dL,  mg/dL 03/21/24  Influenza Immunization: typically does not receive    Drug Interactions:  No significant drug-drug interactions exist requiring adjustment to medication therapy.     Assessment/Plan   Problem List Items Addressed This Visit       Class 2 severe obesity due to excess calories with serious comorbidity and body mass index (BMI) of 35.0 to 35.9 in adult    Current regimen includes Zepbound 7.5 mg once weekly. Patient's current weight reported as 162 lbs. Lowest reported weight was 159 lbs on 02/18/25. Starting weight was 189.4 lbs on 03/21/24. Had lost 30.4 lbs (~16.1% of TBW) since starting therapy plan at her lowest weight. Weight has remained stable since last encounter despite titrating up to the 7.5 mg dose. She " reports generally doing well with the Zepbound, some occasional nausea around when she gives her dose and some occasional off and on constipation but these have been mild and manageable. She would like to titrate up to the next dose.     Medication Changes:  INCREASE:   Zepbound 10 mg under the skin once weekly (increased from 7.5 mg)    Future Considerations:  If patient is tolerable to the 10 mg dose of Zepbound, could titrate up further to the 12.5 mg dose at next follow-up if insurance is still covering    Monitoring and Education:  Counseled patient on relevant MOA, expectations, side effects, duration of therapy, contraindications, administration, and monitoring parameters.  We will continue to assess for new/worsening side effects at future follow-ups, patient informed with dose increases it is possible that side effects may worsen. Since she has done well so far hopefully anything new is mild and manageable  Updated weight will be obtained at next follow-up to assess efficacy of medication therapy   We did discuss the change to her insurance coverage, she is unsure when they will stop covering weight loss medications. I will run a test claim prior to each follow-up to check for any changes to her coverage.     We will plan to follow-up in ~1 month to see how she has done with the increased dose of Zepbound. She was encouraged to reach out with any questions and/or concerns prior to then.           Other Visit Diagnoses         Class 2 severe obesity due to excess calories with serious comorbidity and body mass index (BMI) of 38.0 to 38.9 in adult    -  Primary    Relevant Medications    tirzepatide, weight loss, (Zepbound) 10 mg/0.5 mL injection    Other Relevant Orders    Referral to Clinical Pharmacy          Pharmacy Follow-Up: 06/24/2025    PCP Follow-Up: 08/21/2025    Gerry Encarnacion PharmD     Continue all meds under the continuation of care with the referring provider and clinical pharmacy team.

## 2025-05-22 ENCOUNTER — PHARMACY VISIT (OUTPATIENT)
Dept: PHARMACY | Facility: CLINIC | Age: 55
End: 2025-05-22
Payer: COMMERCIAL

## 2025-06-24 ENCOUNTER — APPOINTMENT (OUTPATIENT)
Dept: PHARMACY | Facility: HOSPITAL | Age: 55
End: 2025-06-24
Payer: COMMERCIAL

## 2025-06-24 DIAGNOSIS — E66.01 CLASS 2 SEVERE OBESITY DUE TO EXCESS CALORIES WITH SERIOUS COMORBIDITY AND BODY MASS INDEX (BMI) OF 35.0 TO 35.9 IN ADULT: Primary | ICD-10-CM

## 2025-06-24 DIAGNOSIS — E66.812 CLASS 2 SEVERE OBESITY DUE TO EXCESS CALORIES WITH SERIOUS COMORBIDITY AND BODY MASS INDEX (BMI) OF 38.0 TO 38.9 IN ADULT: ICD-10-CM

## 2025-06-24 DIAGNOSIS — E66.812 CLASS 2 SEVERE OBESITY DUE TO EXCESS CALORIES WITH SERIOUS COMORBIDITY AND BODY MASS INDEX (BMI) OF 35.0 TO 35.9 IN ADULT: Primary | ICD-10-CM

## 2025-06-24 DIAGNOSIS — E66.01 CLASS 2 SEVERE OBESITY DUE TO EXCESS CALORIES WITH SERIOUS COMORBIDITY AND BODY MASS INDEX (BMI) OF 38.0 TO 38.9 IN ADULT: ICD-10-CM

## 2025-06-24 RX ORDER — PROGESTERONE 200 MG/1
200 CAPSULE ORAL DAILY
COMMUNITY
Start: 2025-06-06

## 2025-06-24 NOTE — ASSESSMENT & PLAN NOTE
Current regimen includes nothing. Patient stopped Zepbound on her own ~2 weeks ago. Patient's current weight reported as 167 lbs. Lowest reported weight was 159 lbs on 02/18/25. Starting weight was 189.4 lbs on 03/21/24. Had lost 30.4 lbs (~16.1% of TBW) since starting therapy plan at her lowest weight. She has gained ~5 lbs since our last encounter. Due to side effects of nausea, stomach upset, and gas, not seeing additional results, and knowing her insurance will stop covering the medication later this year she would prefer to stop therapy.     Medication Changes:  STOP:   Zepbound 10 mg under the skin once weekly (increased from 7.5 mg)    Future Considerations:  If insurance changes in the future could always discuss re-starting therapy if patient would like too     Monitoring and Education:  Patient was educated to try to maintain current portion sizes despite likely having an increase in hunger after stopping therapy as this will help maintain weight los     At this time no further follow-up with the pharmacy team is needed. She was encouraged to reach out with any questions and/or concerns in the future if needed.

## 2025-06-24 NOTE — PROGRESS NOTES
Patient is sent at the request of Laura Saldaña M* for my opinion regarding weight management.  My final recommendations will be communicated back to the requesting provider by way of shared medical record.    Subjective     Tammy Rena Lepley is a 54 y.o. female with class 2 obesity prior to start of GLP-1 therapy as evidenced by her starting in-office weight of 86.2 kg and calculated BMI of 38.38 kg/m2 on 04/16/24 which indicated her for medication assisted weight loss. Weight related comorbidities include hypertension and hyperlipidemia.     At last pharmacy encounter, patient was tolerating the increased dose of Zepbound well, only reporting occasional nausea and on and off constipation. He dose was titrated up to 10 mg once weekly. She did mention that at some point this year her insurance will stop covering weight loss medications but she is unsure of the exact date and thinks it may be when her prior authorization runs out in October. She still has coverage as of today but notes that she stopped Zepbound ~2 weeks ago due to side effects and not seeing additional results.       Past Medical History:  She has no past medical history on file.    Past Surgical History:  She has no past surgical history on file.    Social History:  She reports that she has never smoked. She has never been exposed to tobacco smoke. She has never used smokeless tobacco. She reports that she does not currently use alcohol. She reports that she does not use drugs.    Family History:  No family history on file.    Allergies:  Patient has no known allergies.    Current diet:   No changes since last pharmacy encounter  Will eat 2-3 meals per day, will either skip breakfast or lunch depending on how busy her workday is  Breakfast: Currently finds herself eating a protein breakfast bar or the breakfast drinks  Lunch: varies - does pack her lunch; has been trying to take a salad or turkey/chicken sandwich, will also have the lean  cuisine or healthy frozen microwave meals   Dinner: if she cooks she cooks for her whole family which makes it challenging to stick to a diet, will typically have a protein, vegetable, sometimes more pasta and carbs than other times   Snacks: occasionally will snack after dinner - will have a snack bar or a cake, sometimes chips   Fluids: coffee and water, once in a while will have a soda  Generally eats smaller portion sizes  Eats out about once per week   Is generally trying to eat healthier     Current exercise:   No changes since last encounter  Walks a lot at work   Does have a fitness set with weights and an elliptical   Has been having some trouble with her knee after falling, wants to get it checked before using the elliptical machine more  Has been walking more due to warmer weather and has been gardening and working around the house as well     Previous Weight Loss Attempts:   Has tried Mary Jaswindre - states she previously has done well with this but was unable to keep up with it due to cost   Weight Watchers off and on, states this helps but wasn't able to keep up with it consistently long-term  States that obesity does run in her family (mother, sister, grandmothers)  Has tried intermittent fasting in the past   Has tried general diets and exercising more   Has seen a weight loss doctor in the past, took Metformin but states she didn't like the way it made her feel (noted GI upset and brain fog)    The patient does not have a known family history of diabetes. Was diagnosed with gestational diabetes with her second pregnancy.     Adverse Effects:   Noted nausea and gas and generally    Objective     Medication Reconciliation:   No changes reported by patient today     Current Outpatient Medications on File Prior to Visit   Medication Sig Dispense Refill    cholecalciferol (Vitamin D-3) 25 MCG (1000 UT) capsule Take 1 capsule (25 mcg) by mouth once daily.      fluocinolone (Synalar) 0.025 % ointment Apply a  "pea-sized amount to affected vulvar area twice weekly. May use 1-2 times daily for 1 to 2 (TWO) weeks as needed for flareups]      ibuprofen (Motrin) capsule Take 1 capsule (200 mg) by mouth.      multivitamin tablet Take 1 tablet by mouth once daily.      progesterone (Prometrium) 200 mg capsule Take 1 capsule (200 mg) by mouth once daily. After dinner      [DISCONTINUED] levothyroxine (Synthroid, Levoxyl) 50 mcg tablet TAKE 1 TABLET BY MOUTH EVERY DAY 90 tablet 3    [DISCONTINUED] Osphena 60 mg tablet Take 1 tablet by mouth once daily.      [DISCONTINUED] tirzepatide, weight loss, (Zepbound) 10 mg/0.5 mL injection Inject 10 mg under the skin every 7 days. (Patient not taking: Reported on 6/24/2025) 2 mL 0     No current facility-administered medications on file prior to visit.      Last Recorded Vitals:  BP Readings from Last 6 Encounters:   02/25/25 130/80   03/21/24 130/82   10/04/23 115/81   05/15/23 133/87   04/10/23 138/86     Wt Readings from Last 6 Encounters:   02/25/25 73.7 kg (162 lb 6.4 oz)   04/16/24 86.2 kg (190 lb)   03/21/24 85.9 kg (189 lb 6.4 oz)   10/04/23 87.2 kg (192 lb 3.2 oz)   05/15/23 89.4 kg (197 lb)   04/10/23 90.9 kg (200 lb 6.4 oz)     Estimated body mass index is 32.8 kg/m² as calculated from the following:    Height as of 2/25/25: 1.499 m (4' 11\").    Weight as of 2/25/25: 73.7 kg (162 lb 6.4 oz).    Patient Reported Home Weights:   05/14/24: 184 lbs    06/11/24: 182 lbs   07/09/24: 180 lbs (went on vacation which may have limited her weight loss)  08/06/24: 175.6 lbs  09/03/24: 173.6 lbs    10/01/24: 172.8 lbs   10/29/24: 168 lbs   11/26/24: 167 lbs   02/18/25: 159 lbs  03/18/25: 161 lbs   04/15/25: 162 lbs   05/20/25: 162 lbs   06/24/25: 167 lbs     Goal Weight: ~150 lbs      Weight Loss Pharmacotherapy:  Zepbound 10 mg once weekly (Saturday/Sunday) - stopped two weeks ago    Historical Weight Loss Pharmacotherapy:   Metformin (didn't tolerate it well and did not feel that it did a " "lot to help her lose weight)  Wegovy 1.7 mg (switched to Zepbound for additional weight loss effects)    Primary/Secondary Prevention   Statin? No    Pertinent PMH Review:  PMH of Pancreatitis: No  PMH of Retinopathy: No  PMH of MTC/MEN 2: No    Lab Review  Lab Results   Component Value Date    BILITOT 0.3 02/25/2025    CALCIUM 9.7 02/25/2025    CO2 30 02/25/2025     02/25/2025    CREATININE 0.80 02/25/2025    GLUCOSE 86 02/25/2025    ALKPHOS 75 02/25/2025    K 4.7 02/25/2025    PROT 7.3 02/25/2025     02/25/2025    AST 5 (L) 02/25/2025    ALT 4 (L) 02/25/2025    BUN 11 02/25/2025    ANIONGAP 7 02/25/2025    ALBUMIN 4.3 02/25/2025    GFRF 77 04/22/2023     Lab Results   Component Value Date    TRIG 204 (H) 02/25/2025    CHOL 263 (H) 02/25/2025    LDLCALC 162 (H) 02/25/2025    HDL 65 02/25/2025     Lab Results   Component Value Date    HGBA1C 5.4 02/25/2025    HGBA1C 5.8 (H) 03/21/2024    HGBA1C 5.8 (A) 04/22/2023     No components found for: \"UACR\"  The 10-year ASCVD risk score (aMrisabel MCRAE, et al., 2019) is: 1.9%    Values used to calculate the score:      Age: 54 years      Sex: Female      Is Non- : No      Diabetic: No      Tobacco smoker: No      Systolic Blood Pressure: 130 mmHg      Is BP treated: No      HDL Cholesterol: 65 mg/dL      Total Cholesterol: 263 mg/dL    Health Maintenance:   Lipid Panel:  mg/dL,  mg/dL 02/25/25  Influenza Immunization: typically does not receive    Drug Interactions:  No significant drug-drug interactions exist requiring adjustment to medication therapy.     Assessment/Plan   Problem List Items Addressed This Visit       Class 2 severe obesity due to excess calories with serious comorbidity and body mass index (BMI) of 35.0 to 35.9 in adult - Primary    Current regimen includes nothing. Patient stopped Zepbound on her own ~2 weeks ago. Patient's current weight reported as 167 lbs. Lowest reported weight was 159 lbs on 02/18/25. " Starting weight was 189.4 lbs on 03/21/24. Had lost 30.4 lbs (~16.1% of TBW) since starting therapy plan at her lowest weight. She has gained ~5 lbs since our last encounter. Due to side effects of nausea, stomach upset, and gas, not seeing additional results, and knowing her insurance will stop covering the medication later this year she would prefer to stop therapy.     Medication Changes:  STOP:   Zepbound 10 mg under the skin once weekly (increased from 7.5 mg)    Future Considerations:  If insurance changes in the future could always discuss re-starting therapy if patient would like too     Monitoring and Education:  Patient was educated to try to maintain current portion sizes despite likely having an increase in hunger after stopping therapy as this will help maintain weight los     At this time no further follow-up with the pharmacy team is needed. She was encouraged to reach out with any questions and/or concerns in the future if needed.           Other Visit Diagnoses         Class 2 severe obesity due to excess calories with serious comorbidity and body mass index (BMI) of 38.0 to 38.9 in adult              Pharmacy Follow-Up: As needed per patient or provider request    PCP Follow-Up: 08/21/2025    Gerry Encarnacion PharmD     Continue all meds under the continuation of care with the referring provider and clinical pharmacy team.

## 2025-08-21 ENCOUNTER — APPOINTMENT (OUTPATIENT)
Dept: PRIMARY CARE | Facility: CLINIC | Age: 55
End: 2025-08-21
Payer: COMMERCIAL

## 2025-08-21 VITALS
HEIGHT: 59 IN | OXYGEN SATURATION: 99 % | WEIGHT: 176.2 LBS | SYSTOLIC BLOOD PRESSURE: 130 MMHG | TEMPERATURE: 97.7 F | BODY MASS INDEX: 35.52 KG/M2 | RESPIRATION RATE: 16 BRPM | DIASTOLIC BLOOD PRESSURE: 80 MMHG | HEART RATE: 73 BPM

## 2025-08-21 DIAGNOSIS — I10 PRIMARY HYPERTENSION: Primary | ICD-10-CM

## 2025-08-21 DIAGNOSIS — Z00.00 HEALTHCARE MAINTENANCE: ICD-10-CM

## 2025-08-21 DIAGNOSIS — E03.9 HYPOTHYROIDISM, UNSPECIFIED TYPE: ICD-10-CM

## 2025-08-21 DIAGNOSIS — E55.9 VITAMIN D DEFICIENCY: ICD-10-CM

## 2025-08-21 DIAGNOSIS — E78.2 MIXED HYPERLIPIDEMIA: ICD-10-CM

## 2025-08-21 DIAGNOSIS — N95.1 MENOPAUSAL SYMPTOMS: ICD-10-CM

## 2025-08-21 DIAGNOSIS — R73.03 PREDIABETES: ICD-10-CM

## 2025-08-21 PROCEDURE — 3079F DIAST BP 80-89 MM HG: CPT | Performed by: INTERNAL MEDICINE

## 2025-08-21 PROCEDURE — 99214 OFFICE O/P EST MOD 30 MIN: CPT | Performed by: INTERNAL MEDICINE

## 2025-08-21 PROCEDURE — 3008F BODY MASS INDEX DOCD: CPT | Performed by: INTERNAL MEDICINE

## 2025-08-21 PROCEDURE — 1036F TOBACCO NON-USER: CPT | Performed by: INTERNAL MEDICINE

## 2025-08-21 PROCEDURE — 3075F SYST BP GE 130 - 139MM HG: CPT | Performed by: INTERNAL MEDICINE

## 2025-08-21 ASSESSMENT — ENCOUNTER SYMPTOMS
DIZZINESS: 0
SINUS PRESSURE: 0
SPEECH DIFFICULTY: 0
VOMITING: 0
ALLERGIC/IMMUNOLOGIC NEGATIVE: 1
FACIAL ASYMMETRY: 0
SEIZURES: 0
ABDOMINAL PAIN: 0
ADENOPATHY: 0
NERVOUS/ANXIOUS: 0
DIFFICULTY URINATING: 0
UNEXPECTED WEIGHT CHANGE: 0
PALPITATIONS: 0
NECK PAIN: 0
SLEEP DISTURBANCE: 0
NAUSEA: 0
PSYCHIATRIC NEGATIVE: 1
WEAKNESS: 0
COUGH: 0
CHEST TIGHTNESS: 0
BRUISES/BLEEDS EASILY: 0
NUMBNESS: 0
COLOR CHANGE: 0
ENDOCRINE NEGATIVE: 1
DYSURIA: 0
EYES NEGATIVE: 1
JOINT SWELLING: 0
WOUND: 0
NEUROLOGICAL NEGATIVE: 1
SHORTNESS OF BREATH: 0
CONFUSION: 0
WHEEZING: 0
HALLUCINATIONS: 0
SORE THROAT: 0
AGITATION: 0
FREQUENCY: 0
BLOOD IN STOOL: 0
MYALGIAS: 0
EYE PAIN: 0
DIARRHEA: 0
FLANK PAIN: 0
SINUS PAIN: 0
NECK STIFFNESS: 0
POLYDIPSIA: 0
ACTIVITY CHANGE: 0
TROUBLE SWALLOWING: 0
HEMATOLOGIC/LYMPHATIC NEGATIVE: 1
HEADACHES: 0
RESPIRATORY NEGATIVE: 1
EYE DISCHARGE: 0
EYE REDNESS: 0
TREMORS: 0
CONSTITUTIONAL NEGATIVE: 1
APPETITE CHANGE: 0
STRIDOR: 0
CONSTIPATION: 0
VOICE CHANGE: 0
MUSCULOSKELETAL NEGATIVE: 1
POLYPHAGIA: 0
GASTROINTESTINAL NEGATIVE: 1
LIGHT-HEADEDNESS: 0
ARTHRALGIAS: 0
BACK PAIN: 0
CARDIOVASCULAR NEGATIVE: 1

## 2025-08-21 ASSESSMENT — PATIENT HEALTH QUESTIONNAIRE - PHQ9
SUM OF ALL RESPONSES TO PHQ9 QUESTIONS 1 AND 2: 0
1. LITTLE INTEREST OR PLEASURE IN DOING THINGS: NOT AT ALL
2. FEELING DOWN, DEPRESSED OR HOPELESS: NOT AT ALL

## 2025-08-21 ASSESSMENT — PAIN SCALES - GENERAL: PAINLEVEL_OUTOF10: 0-NO PAIN

## 2026-02-19 ENCOUNTER — APPOINTMENT (OUTPATIENT)
Dept: PRIMARY CARE | Facility: CLINIC | Age: 56
End: 2026-02-19
Payer: COMMERCIAL